# Patient Record
Sex: FEMALE | Race: WHITE | NOT HISPANIC OR LATINO | Employment: OTHER | URBAN - METROPOLITAN AREA
[De-identification: names, ages, dates, MRNs, and addresses within clinical notes are randomized per-mention and may not be internally consistent; named-entity substitution may affect disease eponyms.]

---

## 2023-03-09 DIAGNOSIS — E78.5 HYPERLIPIDEMIA, UNSPECIFIED HYPERLIPIDEMIA TYPE: Primary | ICD-10-CM

## 2023-03-15 RX ORDER — LOVASTATIN 40 MG/1
TABLET ORAL
Qty: 7 TABLET | Refills: 0 | Status: SHIPPED | OUTPATIENT
Start: 2023-03-15 | End: 2023-03-30 | Stop reason: SDUPTHER

## 2023-03-22 DIAGNOSIS — I48.91 UNSPECIFIED ATRIAL FIBRILLATION (MULTI): ICD-10-CM

## 2023-03-29 PROBLEM — M25.562 KNEE PAIN, LEFT: Status: ACTIVE | Noted: 2023-03-29

## 2023-03-29 PROBLEM — M99.05 SEGMENTAL AND SOMATIC DYSFUNCTION OF PELVIC REGION: Status: ACTIVE | Noted: 2023-03-29

## 2023-03-29 PROBLEM — R79.89 ELEVATED SERUM CREATININE: Status: ACTIVE | Noted: 2023-03-29

## 2023-03-29 PROBLEM — M99.03 LUMBOSACRAL DYSFUNCTION: Status: ACTIVE | Noted: 2023-03-29

## 2023-03-29 PROBLEM — R03.0 BLOOD PRESSURE ELEVATED WITHOUT HISTORY OF HTN: Status: ACTIVE | Noted: 2023-03-29

## 2023-03-29 PROBLEM — G47.39 MIXED SLEEP APNEA: Status: ACTIVE | Noted: 2023-03-29

## 2023-03-29 PROBLEM — J30.9 ALLERGIC RHINITIS: Status: ACTIVE | Noted: 2023-03-29

## 2023-03-29 PROBLEM — M99.04 SACROILIAC JOINT SOMATIC DYSFUNCTION: Status: ACTIVE | Noted: 2023-03-29

## 2023-03-29 PROBLEM — M47.816 LUMBAR SPONDYLOSIS: Status: ACTIVE | Noted: 2023-03-29

## 2023-03-29 PROBLEM — E66.01 MORBID OBESITY (MULTI): Status: ACTIVE | Noted: 2023-03-29

## 2023-03-29 PROBLEM — H91.90 HEARING LOSS: Status: ACTIVE | Noted: 2023-03-29

## 2023-03-29 PROBLEM — G89.29 CHRONIC BILATERAL LOW BACK PAIN WITHOUT SCIATICA: Status: ACTIVE | Noted: 2023-03-29

## 2023-03-29 PROBLEM — I48.91 ATRIAL FIBRILLATION (MULTI): Status: ACTIVE | Noted: 2023-03-29

## 2023-03-29 PROBLEM — I50.9 CHF (CONGESTIVE HEART FAILURE) (MULTI): Status: ACTIVE | Noted: 2023-03-29

## 2023-03-29 PROBLEM — E78.5 HYPERLIPIDEMIA: Status: ACTIVE | Noted: 2023-03-29

## 2023-03-29 PROBLEM — D75.1 ERYTHROCYTOSIS: Status: ACTIVE | Noted: 2023-03-29

## 2023-03-29 PROBLEM — M54.9 CHRONIC BACK PAIN: Status: ACTIVE | Noted: 2023-03-29

## 2023-03-29 PROBLEM — M54.16 CHRONIC LUMBAR RADICULOPATHY: Status: ACTIVE | Noted: 2023-03-29

## 2023-03-29 PROBLEM — I50.20 HFREF (HEART FAILURE WITH REDUCED EJECTION FRACTION) (MULTI): Status: ACTIVE | Noted: 2023-03-29

## 2023-03-29 PROBLEM — H93.13 TINNITUS OF BOTH EARS: Status: ACTIVE | Noted: 2023-03-29

## 2023-03-29 PROBLEM — M54.50 CHRONIC BILATERAL LOW BACK PAIN WITHOUT SCIATICA: Status: ACTIVE | Noted: 2023-03-29

## 2023-03-29 PROBLEM — I48.19 PERSISTENT ATRIAL FIBRILLATION (MULTI): Status: ACTIVE | Noted: 2023-03-29

## 2023-03-29 PROBLEM — L98.9 SKIN LESION: Status: ACTIVE | Noted: 2023-03-29

## 2023-03-29 PROBLEM — H90.A22 SENSORINEURAL HEARING LOSS (SNHL) OF LEFT EAR WITH RESTRICTED HEARING OF RIGHT EAR: Status: ACTIVE | Noted: 2023-03-29

## 2023-03-29 PROBLEM — M17.12 ARTHRITIS OF KNEE, LEFT: Status: ACTIVE | Noted: 2023-03-29

## 2023-03-29 PROBLEM — G89.29 CHRONIC BACK PAIN: Status: ACTIVE | Noted: 2023-03-29

## 2023-03-29 PROBLEM — K92.1 HEMATOCHEZIA: Status: ACTIVE | Noted: 2023-03-29

## 2023-03-29 PROBLEM — M99.02 SEGMENTAL AND SOMATIC DYSFUNCTION OF THORACIC REGION: Status: ACTIVE | Noted: 2023-03-29

## 2023-03-29 PROBLEM — G47.33 OSA (OBSTRUCTIVE SLEEP APNEA): Status: ACTIVE | Noted: 2023-03-29

## 2023-03-29 PROBLEM — K59.09 CHRONIC CONSTIPATION: Status: ACTIVE | Noted: 2023-03-29

## 2023-03-29 PROBLEM — H90.A31 MIXED CONDUCTIVE AND SENSORINEURAL HEARING LOSS OF RIGHT EAR WITH RESTRICTED HEARING OF LEFT EAR: Status: ACTIVE | Noted: 2023-03-29

## 2023-03-29 PROBLEM — H90.3 SENSORINEURAL HEARING LOSS (SNHL) OF BOTH EARS: Status: ACTIVE | Noted: 2023-03-29

## 2023-03-29 RX ORDER — METOPROLOL SUCCINATE 50 MG/1
1 TABLET, EXTENDED RELEASE ORAL 2 TIMES DAILY
COMMUNITY
Start: 2022-11-03 | End: 2023-05-26

## 2023-03-29 RX ORDER — MECLIZINE HYDROCHLORIDE 25 MG/1
1 TABLET ORAL EVERY 8 HOURS PRN
COMMUNITY
Start: 2021-11-15 | End: 2024-02-23 | Stop reason: WASHOUT

## 2023-03-29 RX ORDER — LISINOPRIL 5 MG/1
1 TABLET ORAL DAILY
COMMUNITY
End: 2023-11-22 | Stop reason: SDUPTHER

## 2023-03-29 RX ORDER — NORTRIPTYLINE HYDROCHLORIDE 10 MG/1
1 CAPSULE ORAL NIGHTLY
COMMUNITY
Start: 2021-10-25 | End: 2023-06-12

## 2023-03-29 RX ORDER — LEVALBUTEROL TARTRATE 45 UG/1
2 AEROSOL, METERED ORAL EVERY 4 HOURS PRN
COMMUNITY

## 2023-03-29 RX ORDER — CHOLECALCIFEROL (VITAMIN D3) 125 MCG
1 CAPSULE ORAL DAILY
COMMUNITY
Start: 2021-12-16

## 2023-03-29 RX ORDER — FUROSEMIDE 40 MG/1
1 TABLET ORAL DAILY
COMMUNITY
Start: 2022-11-03 | End: 2023-11-08 | Stop reason: ALTCHOICE

## 2023-03-30 ENCOUNTER — OFFICE VISIT (OUTPATIENT)
Dept: PRIMARY CARE | Facility: CLINIC | Age: 73
End: 2023-03-30
Payer: MEDICARE

## 2023-03-30 VITALS
HEART RATE: 82 BPM | OXYGEN SATURATION: 98 % | DIASTOLIC BLOOD PRESSURE: 78 MMHG | BODY MASS INDEX: 54.37 KG/M2 | SYSTOLIC BLOOD PRESSURE: 118 MMHG | HEIGHT: 61 IN | WEIGHT: 288 LBS

## 2023-03-30 DIAGNOSIS — E78.5 HYPERLIPIDEMIA, UNSPECIFIED HYPERLIPIDEMIA TYPE: ICD-10-CM

## 2023-03-30 DIAGNOSIS — I50.20 HFREF (HEART FAILURE WITH REDUCED EJECTION FRACTION) (MULTI): ICD-10-CM

## 2023-03-30 DIAGNOSIS — I48.91 UNSPECIFIED ATRIAL FIBRILLATION (MULTI): ICD-10-CM

## 2023-03-30 DIAGNOSIS — E66.01 MORBID OBESITY (MULTI): ICD-10-CM

## 2023-03-30 PROCEDURE — G0439 PPPS, SUBSEQ VISIT: HCPCS | Performed by: INTERNAL MEDICINE

## 2023-03-30 PROCEDURE — 1170F FXNL STATUS ASSESSED: CPT | Performed by: INTERNAL MEDICINE

## 2023-03-30 PROCEDURE — 1036F TOBACCO NON-USER: CPT | Performed by: INTERNAL MEDICINE

## 2023-03-30 PROCEDURE — 1159F MED LIST DOCD IN RCRD: CPT | Performed by: INTERNAL MEDICINE

## 2023-03-30 PROCEDURE — 99214 OFFICE O/P EST MOD 30 MIN: CPT | Performed by: INTERNAL MEDICINE

## 2023-03-30 RX ORDER — AMIODARONE HYDROCHLORIDE 200 MG/1
TABLET ORAL
COMMUNITY
Start: 2023-03-10 | End: 2023-11-08 | Stop reason: ALTCHOICE

## 2023-03-30 RX ORDER — FLUTICASONE PROPIONATE 50 MCG
SPRAY, SUSPENSION (ML) NASAL
COMMUNITY
End: 2024-05-29 | Stop reason: ALTCHOICE

## 2023-03-30 RX ORDER — CETIRIZINE HYDROCHLORIDE 10 MG/1
TABLET ORAL
COMMUNITY

## 2023-03-30 RX ORDER — DEXTROMETHORPHAN HYDROBROMIDE, GUAIFENESIN 5; 100 MG/5ML; MG/5ML
LIQUID ORAL
COMMUNITY
Start: 2021-11-01

## 2023-03-30 RX ORDER — LOVASTATIN 40 MG/1
40 TABLET ORAL DAILY
Qty: 90 TABLET | Refills: 1 | Status: SHIPPED | OUTPATIENT
Start: 2023-03-30 | End: 2023-09-26

## 2023-03-30 ASSESSMENT — ACTIVITIES OF DAILY LIVING (ADL)
BATHING: INDEPENDENT
MANAGING_FINANCES: INDEPENDENT
GROCERY_SHOPPING: INDEPENDENT
DOING_HOUSEWORK: INDEPENDENT
TAKING_MEDICATION: INDEPENDENT
DRESSING: INDEPENDENT

## 2023-03-30 ASSESSMENT — ENCOUNTER SYMPTOMS
LOSS OF SENSATION IN FEET: 1
OCCASIONAL FEELINGS OF UNSTEADINESS: 1
DEPRESSION: 0

## 2023-03-30 NOTE — PROGRESS NOTES
Chief Complaint: Medicare Wellness Exam/Comprehensive Problem Focused Follow Up     HPI:    Patient overall feels well no active issues here for wellness exam states still in A-fib asymptomatic had cardioversions in the past  Active Problem List      Comprehensive Medical/Surgical/Social/Family History  Past Medical History:   Diagnosis Date    Body mass index (BMI) 50.0-59.9, adult (CMS/HCC) 04/22/2022    BMI 50.0-59.9, adult    Body mass index (BMI) 50.0-59.9, adult (CMS/HCC) 03/17/2022    BMI 50.0-59.9, adult    Essential (primary) hypertension     Benign essential hypertension    Other intervertebral disc displacement, lumbar region     Lumbar herniated disc    Personal history of other diseases of the digestive system     History of diverticulitis    Personal history of other diseases of the digestive system     History of constipation    Personal history of other endocrine, nutritional and metabolic disease     History of mixed hyperlipidemia    Personal history of other endocrine, nutritional and metabolic disease     History of vitamin D deficiency    Personal history of other specified conditions     History of vertigo     Past Surgical History:   Procedure Laterality Date    OTHER SURGICAL HISTORY  12/16/2021    Colonoscopy    OTHER SURGICAL HISTORY  12/16/2021    Knee surgery    OTHER SURGICAL HISTORY  12/16/2021    Partial hysterectomy     Social History     Social History Narrative    Not on file         Allergies and Medications  Enviro stress  Current Outpatient Medications on File Prior to Visit   Medication Sig Dispense Refill    acetaminophen (Tylenol 8 Hour) 650 mg ER tablet 1-2 tablet EVERY 12 HOURS (route: oral)      cetirizine (ZyrTEC) 10 mg tablet Take by mouth.      cholecalciferol (Vitamin D-3) 125 MCG (5000 UT) capsule Take 1 capsule (125 mcg) by mouth once daily.      furosemide (Lasix) 40 mg tablet Take 1 tablet (40 mg) by mouth once daily.      levalbuterol (Xopenex) 45 mcg/actuation  "inhaler Inhale 2 puffs every 4 hours if needed.      lisinopril 5 mg tablet Take 1 tablet (5 mg) by mouth once daily.      meclizine (Antivert) 25 mg tablet Take 1 tablet (25 mg) by mouth every 8 hours if needed.      metoprolol succinate XL (Toprol-XL) 50 mg 24 hr tablet Take 1 tablet (50 mg) by mouth in the morning and 1 tablet (50 mg) before bedtime.      nortriptyline (Pamelor) 10 mg capsule Take 1 capsule (10 mg) by mouth once daily at bedtime.      [DISCONTINUED] apixaban (Eliquis) 5 mg tablet Take 1 tablet (5 mg) by mouth in the morning and 1 tablet (5 mg) in the evening. Do all this for 7 days. PATIENT NEEDS APPOINTMENT. 14 tablet 0    amiodarone (Pacerone) 200 mg tablet       fluticasone (Flonase) 50 mcg/actuation nasal spray Administer into affected nostril(s).      [DISCONTINUED] lovastatin (Mevacor) 40 mg tablet TAKE 1 TABLET BY MOUTH EVERY DAY 7 tablet 0     No current facility-administered medications on file prior to visit.       Medications and Supplements  prescribed by me and other practitioners or clinical pharmacist (such as prescriptions, OTC's, herbal therapies and supplements) were reviewed and documented in the medical record.    Tobacco/Alcohol/Opioid use, as well as Illicit Drug Use was screened for/reviewed and documented in Social History section and medication list as appropriate  Activities of Daily Living  In your present state of health, do you have any difficulty performing the following activities?:   Preparing food and eating?: No  Bathing yourself: No  Getting dressed: No  Using the toilet:No  Moving around from place to place: No  In the past year have you fallen or had a near fall?:No    Depression Screen  (Note: if answer to either of the following is \"Yes\", then a more complete depression screening is indicated)   Q1: Over the past two weeks, have you felt down, depressed or hopeless? No  Q2: Over the past two weeks, have you felt little interest or pleasure in doing things? " No    Current exercise habits:   Dietary issues discussed: Yes  Hearing difficulties: Yes  Safe in current home environment: yes  Visual Acuity assessed: no  Cognitive Impairment assessed: no       Advance directives  Advanced Care Planning (including a Living Will, Healthcare POA, as well as specific end of life choices and/or directives), was discussed for approximately 16 minutes with the patient and/or surrogate, voluntarily, and documented in the medical record.     Cardiac Risk Assessment  Cardiovascular risk was discussed and, if needed, lifestyle modifications recommended, including nutritional choices, exercise, and elimination of habits contributing to risk. We agreed on a plan to reduce the current cardiovascular risk based on above discussion as needed.  Aspirin use/disuse was discussed after reviewing the updated guidelines below:    Consider low dose Aspirin ( mg) use if the benefit for cardiovascular disease prevention outweighs risk for bleeding complications.   In general, low dose ASA should be considered:  In patients WITHOUT prior MI/stroke/PAD (primary prevention):   a. Age <60: Use if 10-year cardiovascular disease risk >20%, with discussion of risks and benefits with patient  b. Age 60-<70: Use if 10-year cardiovascular disease risk >20% and low bleeding (e.g., gastrointenstinal) risk, with discussion of risks and benefits with patient  c. Age >=70: Do not use    In patients WITH prior MI/stroke/PAD (secondary prevention):   Generally use unless extremely high bleeding (e.g., gastrointenstinal) risk, with discussion of risks and benefits with patient    ROS otherwise negative aside from what was mentioned above in HPI.  Health Maintenance  Colonoscopy: 2022  Mammogram: [2022]  Pap smear/Pelvic Exam: []  DEXA: []  Low dose chest CT: []   Screening Abdominal US: []  Opthalmology: []  Podiatry: []    Immunizations  Influenza: []  Pneumovax: []  Prevnar 13: []  Td/Tdap: []  Zostavax:  "[]            ROS:  Constitutional: [] denies fever/night sweats/weight loss/fatigue/insomnia  Head: [] denies trauma/headache/masses  Eyes: [] denies loss of vision/blurry vision/diplopia/redness/eye pain  Ears: [] denies hearing loss/tinnitus/masses/pain/otorrhea  Nose: [] denies anosmia/masses/epistaxis/drainage  Throat: [] denies dysphagia/odynophagia  Neck: [] denies masses/asymmetry  Lymphatics: [] denies lymph node swelling  Cardiovascular: [] denies CP/orthopnea/PND/leg edema/palpitations  Pulmonary: [] denies SOB/dyspnea with exertion/cough/sputum production/hemoptysis/wheezing  GI: [] denies abdominal pain/nausea/vomiting/dysphagia/odynophagia/melena/hematochezia/diarrhea/constipation/change in stool caliber/bowel incontinence  : [] denies dysuria/hematuria/increased frequency/nocturia/dribbling/urinary incontinence  Genital: [] denies discharge/masses/lesions  Musculoskeletal: [] denies trauma/arthralgia/myalgia/deformity/joint swelling  Hematologic: [] denies easy bruising or bleeding  Neurologic: [] denies gait imbalance/paresthesias/saddle paresthesia/focal weakness/dysarthria/seizure  Skin: [] denies masses/lesions/rashes/tattoos  Psychiatric: [] denies depression/suicidal or homicidal thoughts    Vitals  /78   Pulse 82   Ht 1.549 m (5' 1\")   Wt 131 kg (288 lb)   SpO2 98%   BMI 54.42 kg/m²   Body mass index is 54.42 kg/m².  Physical Exam  Gen: Alert, NAD  HEENT:  PERRLA, EOMI, conjunctiva and sclera normal in appearance. External auditory canals/TMs normal; Oral cavity and posterior pharynx without lesions/exudate  Neck:  Supple with FROM; No masses/nodes palpable; Thyroid nontender and without nodules; No DON  Respiratory:  Lungs CTAB  Cardiovascular:  Heart RRR. No M/R/G. Peripheral pulses equal bilaterally  Abdomen:  Soft, nontender, BS present throughout; No R/G/R; No HSM or masses palpated  Extremities:  FROM all extremities; Muscle strength grossly normal with good tone ambulates " with walker  Neuro:  CN II-XII intact; Reflexes 2+/2+; Gross motor and sensory intact  Skin:  No suspicious lesions present  Breast: No masses, skin lesions or nipple discharges, no axillary lymphadenopathy    Assessment and Plan:  Problem List Items Addressed This Visit          Circulatory    HFrEF (heart failure with reduced ejection fraction) (CMS/HCC)       Endocrine/Metabolic    Morbid obesity (CMS/HCC)       Other    Hyperlipidemia    Relevant Medications    lovastatin (Mevacor) 40 mg tablet     Other Visit Diagnoses       Unspecified atrial fibrillation (CMS/HCC)        Relevant Medications    apixaban (Eliquis) 5 mg tablet        Follow-up with hematology as planned made 2023 important  Screening blood work due October 2023 CBC BMP A1c lipid T4 TSH A1c    Thank you for making (Dea    Please follow-up in 6 months    During the course of the visit the patient was educated and counseled about age appropriate screening and preventive services. Completed preventive screenings were documented in the chart and orders were placed for outstanding screenings/procedures as documented in the Assessment and Plan.      Patient Instructions (the written plan) was given to the patient at check out.      Jan Martin, DO

## 2023-04-24 DIAGNOSIS — R03.0 ELEVATED BLOOD-PRESSURE READING, WITHOUT DIAGNOSIS OF HYPERTENSION: ICD-10-CM

## 2023-04-24 RX ORDER — METOPROLOL TARTRATE 25 MG/1
TABLET, FILM COATED ORAL
Qty: 180 TABLET | Refills: 1 | Status: SHIPPED | OUTPATIENT
Start: 2023-04-24 | End: 2023-11-29 | Stop reason: ALTCHOICE

## 2023-05-26 DIAGNOSIS — R03.0 ELEVATED BLOOD-PRESSURE READING, WITHOUT DIAGNOSIS OF HYPERTENSION: ICD-10-CM

## 2023-05-26 RX ORDER — METOPROLOL SUCCINATE 50 MG/1
TABLET, EXTENDED RELEASE ORAL
Qty: 180 TABLET | Refills: 1 | Status: SHIPPED | OUTPATIENT
Start: 2023-05-26 | End: 2023-11-22

## 2023-06-12 DIAGNOSIS — Z00.00 ENCOUNTER FOR GENERAL ADULT MEDICAL EXAMINATION WITHOUT ABNORMAL FINDINGS: ICD-10-CM

## 2023-06-12 RX ORDER — NORTRIPTYLINE HYDROCHLORIDE 10 MG/1
CAPSULE ORAL
Qty: 90 CAPSULE | Refills: 1 | Status: SHIPPED | OUTPATIENT
Start: 2023-06-12 | End: 2023-11-29 | Stop reason: SDUPTHER

## 2023-07-24 ENCOUNTER — HOSPITAL ENCOUNTER (OUTPATIENT)
Dept: DATA CONVERSION | Facility: HOSPITAL | Age: 73
End: 2023-07-24
Attending: ANESTHESIOLOGY | Admitting: ANESTHESIOLOGY
Payer: MEDICARE

## 2023-07-24 DIAGNOSIS — M47.816 SPONDYLOSIS WITHOUT MYELOPATHY OR RADICULOPATHY, LUMBAR REGION: ICD-10-CM

## 2023-07-24 DIAGNOSIS — M54.16 RADICULOPATHY, LUMBAR REGION: ICD-10-CM

## 2023-09-22 DIAGNOSIS — I48.91 UNSPECIFIED ATRIAL FIBRILLATION (MULTI): ICD-10-CM

## 2023-09-25 DIAGNOSIS — E78.5 HYPERLIPIDEMIA, UNSPECIFIED HYPERLIPIDEMIA TYPE: ICD-10-CM

## 2023-09-26 RX ORDER — LOVASTATIN 40 MG/1
40 TABLET ORAL DAILY
Qty: 30 TABLET | Refills: 0 | Status: SHIPPED | OUTPATIENT
Start: 2023-09-26 | End: 2023-10-18

## 2023-09-29 VITALS — BODY MASS INDEX: 49.22 KG/M2 | WEIGHT: 277.78 LBS | HEIGHT: 63 IN

## 2023-10-18 DIAGNOSIS — E78.5 HYPERLIPIDEMIA, UNSPECIFIED HYPERLIPIDEMIA TYPE: ICD-10-CM

## 2023-10-18 RX ORDER — LOVASTATIN 40 MG/1
40 TABLET ORAL DAILY
Qty: 90 TABLET | Refills: 1 | Status: SHIPPED | OUTPATIENT
Start: 2023-10-18 | End: 2023-12-15 | Stop reason: ALTCHOICE

## 2023-10-31 DIAGNOSIS — I48.91 UNSPECIFIED ATRIAL FIBRILLATION (MULTI): ICD-10-CM

## 2023-11-02 RX ORDER — APIXABAN 5 MG/1
5 TABLET, FILM COATED ORAL 2 TIMES DAILY
Qty: 60 TABLET | Refills: 0 | Status: SHIPPED | OUTPATIENT
Start: 2023-11-02 | End: 2023-11-29 | Stop reason: SDUPTHER

## 2023-11-03 PROBLEM — I48.19 PERSISTENT ATRIAL FIBRILLATION (MULTI): Status: RESOLVED | Noted: 2023-03-29 | Resolved: 2023-11-03

## 2023-11-03 PROBLEM — H90.A31 MIXED CONDUCTIVE AND SENSORINEURAL HEARING LOSS OF RIGHT EAR WITH RESTRICTED HEARING OF LEFT EAR: Status: RESOLVED | Noted: 2023-03-29 | Resolved: 2023-11-03

## 2023-11-03 PROBLEM — G47.39 MIXED SLEEP APNEA: Status: RESOLVED | Noted: 2023-03-29 | Resolved: 2023-11-03

## 2023-11-03 PROBLEM — H91.90 HEARING LOSS: Status: RESOLVED | Noted: 2023-03-29 | Resolved: 2023-11-03

## 2023-11-03 PROBLEM — H90.A22 SENSORINEURAL HEARING LOSS (SNHL) OF LEFT EAR WITH RESTRICTED HEARING OF RIGHT EAR: Status: RESOLVED | Noted: 2023-03-29 | Resolved: 2023-11-03

## 2023-11-06 ENCOUNTER — APPOINTMENT (OUTPATIENT)
Dept: CARDIOLOGY | Facility: CLINIC | Age: 73
End: 2023-11-06
Payer: MEDICARE

## 2023-11-08 ENCOUNTER — OFFICE VISIT (OUTPATIENT)
Dept: CARDIOLOGY | Facility: CLINIC | Age: 73
End: 2023-11-08
Payer: MEDICARE

## 2023-11-08 VITALS
WEIGHT: 280 LBS | SYSTOLIC BLOOD PRESSURE: 126 MMHG | BODY MASS INDEX: 49.61 KG/M2 | OXYGEN SATURATION: 97 % | DIASTOLIC BLOOD PRESSURE: 72 MMHG | HEART RATE: 78 BPM | HEIGHT: 63 IN

## 2023-11-08 DIAGNOSIS — I50.20 HFREF (HEART FAILURE WITH REDUCED EJECTION FRACTION) (MULTI): Primary | ICD-10-CM

## 2023-11-08 DIAGNOSIS — I48.0 PAROXYSMAL ATRIAL FIBRILLATION (MULTI): ICD-10-CM

## 2023-11-08 PROCEDURE — 1126F AMNT PAIN NOTED NONE PRSNT: CPT | Performed by: PHYSICIAN ASSISTANT

## 2023-11-08 PROCEDURE — 1036F TOBACCO NON-USER: CPT | Performed by: PHYSICIAN ASSISTANT

## 2023-11-08 PROCEDURE — 1159F MED LIST DOCD IN RCRD: CPT | Performed by: PHYSICIAN ASSISTANT

## 2023-11-08 PROCEDURE — 99214 OFFICE O/P EST MOD 30 MIN: CPT | Performed by: PHYSICIAN ASSISTANT

## 2023-11-08 ASSESSMENT — PAIN SCALES - GENERAL: PAINLEVEL: 0-NO PAIN

## 2023-11-08 NOTE — PROGRESS NOTES
"Chief Complaint:   Atrial fibrillation     History Of Present Illness:    Dea Duenas is a 73 y.o. female presenting with atrial fibrillation, nonischemic cardiomyopathy, and recent nuclear stress test.  LVEF previously reduced at 40-45% on 2D echo around the time she was diagnosed with atrial fibrillation.  Patient has been maintaining sinus rhythm on all accounts over at least the past 6 months.  Pharmacologic nuclear stress test displayed normal myocardial perfusion in response to chemical stress, LVEF 61%.  Patient continues with Eliquis 5mg BID.  Patient denies chest pain, chest pressure, palpitations, dyspnea on exertion, shortness of breath at rest, diaphoresis, nausea/vomiting, back pain, headache, lightheadedness, dizziness, syncope or presyncopal episodes, active bleeding signs or symptoms, excessive weight gain, muscle or joint pain, claudication.     Last Recorded Vitals:  Vitals:    11/08/23 1403   BP: 126/72   BP Location: Left arm   Patient Position: Sitting   BP Cuff Size: Adult   Pulse: 78   SpO2: 97%   Weight: 127 kg (280 lb)   Height: 1.6 m (5' 3\")       Past Medical History:  She has a past medical history of Body mass index (BMI) 50.0-59.9, adult (CMS/[facility]) (04/22/2022), Body mass index (BMI) 50.0-59.9, adult (CMS/[facility]) (03/17/2022), Essential (primary) hypertension, Other intervertebral disc displacement, lumbar region, Personal history of other diseases of the digestive system, Personal history of other diseases of the digestive system, Personal history of other endocrine, nutritional and metabolic disease, Personal history of other endocrine, nutritional and metabolic disease, and Personal history of other specified conditions.    Past Surgical History:  She has a past surgical history that includes Other surgical history (12/16/2021); Other surgical history (12/16/2021); and Other surgical history (12/16/2021).      Social History:  She reports that she has never smoked. She has never " used smokeless tobacco. She reports that she does not currently use alcohol. She reports that she does not use drugs.    Family History:  Family History   Problem Relation Name Age of Onset    Colon cancer Mother      Rectal cancer Mother      Cirrhosis Father      Lung cancer Father      Heart attack Father      Colon cancer Mother's Sister          Allergies:  Enviro stress    Outpatient Medications:  Current Outpatient Medications   Medication Instructions    acetaminophen (Tylenol 8 Hour) 650 mg ER tablet 1-2 tablet EVERY 12 HOURS (route: oral)    cetirizine (ZyrTEC) 10 mg tablet oral    cholecalciferol (Vitamin D-3) 125 MCG (5000 UT) capsule 1 capsule, oral, Daily    Eliquis 5 mg, oral, 2 times daily    fluticasone (Flonase) 50 mcg/actuation nasal spray nasal    levalbuterol (Xopenex) 45 mcg/actuation inhaler 2 puffs, inhalation, Every 4 hours PRN    lisinopril 5 mg tablet 1 tablet, oral, Daily    lovastatin (MEVACOR) 40 mg, oral, Daily    meclizine (Antivert) 25 mg tablet 1 tablet, oral, Every 8 hours PRN    metoprolol succinate XL (Toprol-XL) 50 mg 24 hr tablet TAKE 1 TABLET BY MOUTH TWICE A DAY    metoprolol tartrate (Lopressor) 25 mg tablet TAKE 1 TABLET TWICE A DAY    nortriptyline (Pamelor) 10 mg capsule TAKE 1 CAPSULE BY MOUTH EVERYDAY AT BEDTIME       Physical Exam:  Constitutional: awake and alert, oriented ×3, no apparent distress  Skin: warm, dry, good turgor no obvious lesions  Eyes: pupils equal, round, reactive to light, conjunctiva pink and noninjected, no discharge  HENT: normocephalic and atraumatic, mucous membranes moist, trachea midline with no masses/goiter  Cardiovascular: S1/S2 regular, no murmur no rubs/gallops, no carotid bruits, no JVD  Pulmonary: symmetrical chest expansion, lungs are clear to auscultation bilaterally, no wheezes/rales/rhonchi, normal effort  Abdomen: nontender, nondistended, active bowel sounds, no ascites  Extremities: no cyanosis, clubbing, no LE edema no lesions;  "palpable pedal pulses  Neurologic: cranial nerves II - XII grossly intact, stable gait, no tremor       Last Labs:  CBC -  Lab Results   Component Value Date    WBC 6.8 05/18/2023    HGB 15.0 05/18/2023    HCT 47.0 (H) 05/18/2023    MCV 89 05/18/2023     05/18/2023       CMP -  Lab Results   Component Value Date    CALCIUM 9.1 05/18/2023    PHOS 4.6 11/03/2022    PROT 6.4 05/18/2023    ALBUMIN 3.8 05/18/2023    AST 14 05/18/2023    ALT 15 05/18/2023    ALKPHOS 59 05/18/2023    BILITOT 0.9 05/18/2023       LIPID PANEL -   Lab Results   Component Value Date    CHOL 196 10/30/2022    TRIG 159 (H) 10/30/2022    HDL 53.5 10/30/2022    CHHDL 3.7 10/30/2022    LDLF 111 (H) 10/30/2022    VLDL 32 10/30/2022    NHDL 203 12/16/2021       RENAL FUNCTION PANEL -   Lab Results   Component Value Date    GLUCOSE 93 05/18/2023     05/18/2023    K 4.3 05/18/2023     05/18/2023    CO2 31 05/18/2023    ANIONGAP 11 05/18/2023    BUN 17 05/18/2023    CREATININE 1.16 (H) 05/18/2023    CALCIUM 9.1 05/18/2023    PHOS 4.6 11/03/2022    ALBUMIN 3.8 05/18/2023        Lab Results   Component Value Date     (H) 11/30/2022    HGBA1C 5.6 10/30/2022       Last Cardiology Tests:  ECG:  No results found for this or any previous visit from the past 1095 days.      Echo:  No results found for this or any previous visit from the past 1095 days.      Ejection Fractions:  No results found for: \"EF\"    Cath:  No results found for this or any previous visit from the past 1095 days.      Stress Test:  Nuclear Stress Test 8/22/2023  1. Normal stress myocardial perfusion imaging in response to  pharmacologic stress.  2. Well-maintained left ventricular function.  61%    Cardiac Imaging:  No results found for this or any previous visit from the past 1095 days.        Assessment/Plan   Problem List Items Addressed This Visit             ICD-10-CM       Cardiac and Vasculature    Atrial fibrillation (CMS/HCC) I48.91    HFrEF (heart " failure with reduced ejection fraction) (CMS/HCC) - Primary I50.20     -Normalization of LV systolic function on recent pharmacologic nuclear stress test    -Continue DOAC therapy for CVA prophylaxis, no obvious signs/symptoms of pathologic bleeding    -F/U with Dr. Maria in 2/2024 as previously scheduled    LENNY CastilloC

## 2023-11-22 DIAGNOSIS — R03.0 ELEVATED BLOOD-PRESSURE READING, WITHOUT DIAGNOSIS OF HYPERTENSION: ICD-10-CM

## 2023-11-22 RX ORDER — LISINOPRIL 5 MG/1
5 TABLET ORAL DAILY
Qty: 90 TABLET | Refills: 3 | Status: SHIPPED | OUTPATIENT
Start: 2023-11-22 | End: 2023-11-29 | Stop reason: ALTCHOICE

## 2023-11-22 RX ORDER — METOPROLOL SUCCINATE 50 MG/1
50 TABLET, EXTENDED RELEASE ORAL 2 TIMES DAILY
Qty: 60 TABLET | Refills: 0 | Status: SHIPPED | OUTPATIENT
Start: 2023-11-22 | End: 2023-12-20

## 2023-11-29 ENCOUNTER — OFFICE VISIT (OUTPATIENT)
Dept: PRIMARY CARE | Facility: CLINIC | Age: 73
End: 2023-11-29
Payer: MEDICARE

## 2023-11-29 VITALS — SYSTOLIC BLOOD PRESSURE: 126 MMHG | DIASTOLIC BLOOD PRESSURE: 72 MMHG | BODY MASS INDEX: 50.49 KG/M2 | WEIGHT: 285 LBS

## 2023-11-29 DIAGNOSIS — Z00.00 ENCOUNTER FOR GENERAL ADULT MEDICAL EXAMINATION WITHOUT ABNORMAL FINDINGS: ICD-10-CM

## 2023-11-29 DIAGNOSIS — Z12.31 VISIT FOR SCREENING MAMMOGRAM: ICD-10-CM

## 2023-11-29 DIAGNOSIS — R20.2 PARESTHESIAS IN LEFT HAND: ICD-10-CM

## 2023-11-29 DIAGNOSIS — I48.91 UNSPECIFIED ATRIAL FIBRILLATION (MULTI): ICD-10-CM

## 2023-11-29 DIAGNOSIS — I10 PRIMARY HYPERTENSION: Primary | ICD-10-CM

## 2023-11-29 DIAGNOSIS — Z00.00 HEALTHCARE MAINTENANCE: ICD-10-CM

## 2023-11-29 PROCEDURE — 1126F AMNT PAIN NOTED NONE PRSNT: CPT | Performed by: INTERNAL MEDICINE

## 2023-11-29 PROCEDURE — 3078F DIAST BP <80 MM HG: CPT | Performed by: INTERNAL MEDICINE

## 2023-11-29 PROCEDURE — 3074F SYST BP LT 130 MM HG: CPT | Performed by: INTERNAL MEDICINE

## 2023-11-29 PROCEDURE — 1159F MED LIST DOCD IN RCRD: CPT | Performed by: INTERNAL MEDICINE

## 2023-11-29 PROCEDURE — 1036F TOBACCO NON-USER: CPT | Performed by: INTERNAL MEDICINE

## 2023-11-29 PROCEDURE — 99214 OFFICE O/P EST MOD 30 MIN: CPT | Performed by: INTERNAL MEDICINE

## 2023-11-29 RX ORDER — FUROSEMIDE 40 MG/1
TABLET ORAL
COMMUNITY
Start: 2023-11-22 | End: 2024-02-28

## 2023-11-29 RX ORDER — LISINOPRIL 10 MG/1
10 TABLET ORAL DAILY
COMMUNITY
End: 2023-11-29 | Stop reason: SDUPTHER

## 2023-11-29 RX ORDER — NORTRIPTYLINE HYDROCHLORIDE 10 MG/1
10 CAPSULE ORAL NIGHTLY
Qty: 90 CAPSULE | Refills: 1 | Status: SHIPPED | OUTPATIENT
Start: 2023-11-29 | End: 2024-05-29 | Stop reason: SDUPTHER

## 2023-11-29 RX ORDER — LISINOPRIL 10 MG/1
10 TABLET ORAL DAILY
Qty: 90 TABLET | Refills: 1 | Status: SHIPPED | OUTPATIENT
Start: 2023-11-29 | End: 2024-05-29 | Stop reason: SDUPTHER

## 2023-11-29 NOTE — PROGRESS NOTES
Subjective   Patient ID: Dea Duenas is a 73 y.o. female who presents for Follow-up and Med Refill.  HPI        year old female with past medical history of hypertension, hyperlipidemia, diverticulitis, chronic lumbar radiculopathy, carpal tunnel syndrome of the right hand, and osteoarthritis.  Urgent care visit 11/2021 for carpal tunnel of the right hand. Provided with wrist brace at the time.   ER presentation November 3, 2022 respiratory failure secondary to CHF exacerbation 4 to 45% systolic new onset A. fib CT PE negative    Patient states she is overall feeling very well occasionally gets tingling in her left wrist that goes into her first 3 digits over the last 6 months grade 3 out of 10 worse when she lays down at night better when she wiggles it and loosens it up during the day  Denies any paralysis joint swelling redness trauma        Health Maintenance:      Colonoscopy: 2022 due 2027      Mammogram: 2022      Pelvic/Pap:      Low dose chest CT:      Aorta duplex:      Optho:      Podiatry:        Vaccines:      Prevnar 20:      Prevnar 13:      Pneumovax 23:      Tdap:      Shingrix:      COVID:      Influenza:        ROS:      General: denies fever/chills/weight loss      Head: denies HA/trauma/masses/dizziness      Eyes: denies vision change/loss of vision/blurry vision/diplopia/eye pain      Ears: denies hearing loss/tinnitus/otalgia/otorrhea      Nose: denies nasal drainage/anosmia      Throat: denies dysphagia/odynophagia      Lymphatics: denies lymph node swelling      Cardiac: denies CP/palpitations/orthopnea/PND      Pulmonary: denies dyspnea/cough/wheezing      GI: denies abd pain/n/v/diarrhea/melena/hematochezia/hematemesis      : denies dysuria/hematuria/change frequency      Genital: denies genital discharge/lesions      Skin: denies rashes/lesions/masses      MSK: denies weakness/swelling/edema/gait imbalance/pain      Neuro: Occasional paresthesias left wrist hand 6 months denies  "paresthesias/seizures/dysarthria      Psych: denies depression/anxiety/suicidal or homicidal ideations            Objective   /72   Wt 129 kg (285 lb)   BMI 50.49 kg/m²      Physical Exam:     General: AO3, NAD     Head: atraumatic/NC     Eyes: EOMI/PERRLA. Negative APD     Ears: TM pearly gray, EAC clear. No lesions or erythema     Nose: symmetric nares, no discharge     Throat: trachea midline, uvula midline pink mucosa. No thyromegaly     Lymphatics: no cervical/supraclavicular/ant or posterior cervical adenopathy/axillary/inguinal adenopathy     Breast: not examined     Chest: no deformity or tenderness to palpation     Pulm: CTA b/l, no wheeze/rhonchi/rales. nonlabored     Cardiac: RRR +s1s2, no m/r/g.      GI: soft, NT/ND. Normoactive Bsx4. No rebound/guarding.     Rectal: no examined     MSK: Negative Tinel's bilateral 5/5 strength UE LE. No edema/clubbing/cyanosis     Skin: no rashes/lesions     Vascular: 2+ palp DP PT radials b/l. Negative carotid bruit     Neuro: CNII-XII intact. No focal deficits. Reflexes 2/4 brachioradialis bicep tricep patellar achilles. Finger to nose intact.     Psych: appropriate mood/affect                    No results found for: \"BMPR1A\", \"CBCDIF\"  Patient refused EMG at this time    Assessment/Plan   Diagnoses and all orders for this visit:  Primary hypertension  -     lisinopril 10 mg tablet; Take 1 tablet (10 mg) by mouth once daily.  Unspecified atrial fibrillation (CMS/HCC)  -     apixaban (Eliquis) 5 mg tablet; Take 1 tab bid  Encounter for general adult medical examination without abnormal findings  -     nortriptyline (Pamelor) 10 mg capsule; Take 1 capsule (10 mg) by mouth once daily at bedtime.  Healthcare maintenance  -     Lipid Panel; Future  -     Hemoglobin A1C; Future  -     Thyroxine, Free; Future  -     Thyroid Stimulating Hormone; Future  Visit for screening mammogram  -     BI mammo bilateral screening tomosynthesis; Future  Paresthesias in left " hand  Comments:  Suspect carpal tunnel    Recommend wearing a wrist splint every night monitor if any worsening as we discussed we will do the EMG and follow-up with orthopedic for evaluation    Cardiology recommendations appreciated follow-up in February    Please call and follow-up with pulmonary as ordered    Please call and follow-up with hematology as ordered    Screening blood work due May 2024    Thank you for making appointment today Dea    Please follow-up 6 months       Chucky Harper and med refill

## 2023-12-07 ENCOUNTER — LAB (OUTPATIENT)
Dept: LAB | Facility: LAB | Age: 73
End: 2023-12-07
Payer: MEDICARE

## 2023-12-07 ENCOUNTER — ANCILLARY PROCEDURE (OUTPATIENT)
Dept: RADIOLOGY | Facility: CLINIC | Age: 73
End: 2023-12-07
Payer: MEDICARE

## 2023-12-07 DIAGNOSIS — Z12.31 VISIT FOR SCREENING MAMMOGRAM: ICD-10-CM

## 2023-12-07 DIAGNOSIS — Z00.00 HEALTHCARE MAINTENANCE: ICD-10-CM

## 2023-12-07 LAB
CHOLEST SERPL-MCNC: 253 MG/DL (ref 0–199)
CHOLESTEROL/HDL RATIO: 4.8
HDLC SERPL-MCNC: 52.5 MG/DL
LDLC SERPL CALC-MCNC: ABNORMAL MG/DL
NON HDL CHOLESTEROL: 201 MG/DL (ref 0–149)
T4 FREE SERPL-MCNC: 0.98 NG/DL (ref 0.61–1.12)
TRIGL SERPL-MCNC: 417 MG/DL (ref 0–149)
TSH SERPL-ACNC: 1.97 MIU/L (ref 0.44–3.98)
VLDL: ABNORMAL

## 2023-12-07 PROCEDURE — 80061 LIPID PANEL: CPT

## 2023-12-07 PROCEDURE — 83036 HEMOGLOBIN GLYCOSYLATED A1C: CPT

## 2023-12-07 PROCEDURE — 36415 COLL VENOUS BLD VENIPUNCTURE: CPT

## 2023-12-07 PROCEDURE — 77063 BREAST TOMOSYNTHESIS BI: CPT

## 2023-12-07 PROCEDURE — 84439 ASSAY OF FREE THYROXINE: CPT

## 2023-12-07 PROCEDURE — 77067 SCR MAMMO BI INCL CAD: CPT | Performed by: RADIOLOGY

## 2023-12-07 PROCEDURE — 84443 ASSAY THYROID STIM HORMONE: CPT

## 2023-12-07 PROCEDURE — 77063 BREAST TOMOSYNTHESIS BI: CPT | Performed by: RADIOLOGY

## 2023-12-08 LAB
EST. AVERAGE GLUCOSE BLD GHB EST-MCNC: 108 MG/DL
HBA1C MFR BLD: 5.4 %

## 2023-12-15 DIAGNOSIS — E78.5 HYPERLIPIDEMIA, UNSPECIFIED HYPERLIPIDEMIA TYPE: Primary | ICD-10-CM

## 2023-12-15 RX ORDER — ATORVASTATIN CALCIUM 10 MG/1
10 TABLET, FILM COATED ORAL DAILY
Qty: 30 TABLET | Refills: 5 | Status: SHIPPED | OUTPATIENT
Start: 2023-12-15 | End: 2024-04-11

## 2023-12-19 DIAGNOSIS — R03.0 ELEVATED BLOOD-PRESSURE READING, WITHOUT DIAGNOSIS OF HYPERTENSION: ICD-10-CM

## 2023-12-20 RX ORDER — METOPROLOL SUCCINATE 50 MG/1
50 TABLET, EXTENDED RELEASE ORAL 2 TIMES DAILY
Qty: 180 TABLET | Refills: 1 | Status: SHIPPED | OUTPATIENT
Start: 2023-12-20 | End: 2024-04-11

## 2023-12-26 ENCOUNTER — TELEPHONE (OUTPATIENT)
Dept: PRIMARY CARE | Facility: CLINIC | Age: 73
End: 2023-12-26
Payer: MEDICARE

## 2024-02-21 PROBLEM — I48.91 ATRIAL FIBRILLATION (MULTI): Chronic | Status: ACTIVE | Noted: 2023-03-29

## 2024-02-21 PROBLEM — I50.20 HFREF (HEART FAILURE WITH REDUCED EJECTION FRACTION) (MULTI): Chronic | Status: ACTIVE | Noted: 2023-03-29

## 2024-02-22 PROBLEM — H90.3 SENSORINEURAL HEARING LOSS (SNHL) OF BOTH EARS: Status: RESOLVED | Noted: 2023-03-29 | Resolved: 2024-02-22

## 2024-02-22 PROBLEM — E78.5 HYPERLIPIDEMIA: Chronic | Status: ACTIVE | Noted: 2023-03-29

## 2024-02-22 PROBLEM — H93.13 TINNITUS OF BOTH EARS: Status: RESOLVED | Noted: 2023-03-29 | Resolved: 2024-02-22

## 2024-02-22 PROBLEM — R79.89 ELEVATED SERUM CREATININE: Status: RESOLVED | Noted: 2023-03-29 | Resolved: 2024-02-22

## 2024-02-22 PROBLEM — G47.33 OSA (OBSTRUCTIVE SLEEP APNEA): Chronic | Status: ACTIVE | Noted: 2023-03-29

## 2024-02-22 PROBLEM — K59.09 CHRONIC CONSTIPATION: Status: RESOLVED | Noted: 2023-03-29 | Resolved: 2024-02-22

## 2024-02-22 PROBLEM — D75.1 ERYTHROCYTOSIS: Status: RESOLVED | Noted: 2023-03-29 | Resolved: 2024-02-22

## 2024-02-22 PROBLEM — J30.9 ALLERGIC RHINITIS: Status: RESOLVED | Noted: 2023-03-29 | Resolved: 2024-02-22

## 2024-02-23 ENCOUNTER — OFFICE VISIT (OUTPATIENT)
Dept: CARDIOLOGY | Facility: CLINIC | Age: 74
End: 2024-02-23
Payer: MEDICARE

## 2024-02-23 VITALS
OXYGEN SATURATION: 94 % | DIASTOLIC BLOOD PRESSURE: 82 MMHG | BODY MASS INDEX: 48.54 KG/M2 | WEIGHT: 274 LBS | HEART RATE: 89 BPM | SYSTOLIC BLOOD PRESSURE: 134 MMHG

## 2024-02-23 DIAGNOSIS — I50.20 HFREF (HEART FAILURE WITH REDUCED EJECTION FRACTION) (MULTI): Chronic | ICD-10-CM

## 2024-02-23 DIAGNOSIS — E78.2 MIXED HYPERLIPIDEMIA: Primary | Chronic | ICD-10-CM

## 2024-02-23 DIAGNOSIS — I48.0 PAROXYSMAL ATRIAL FIBRILLATION (MULTI): Chronic | ICD-10-CM

## 2024-02-23 PROCEDURE — 1160F RVW MEDS BY RX/DR IN RCRD: CPT | Performed by: INTERNAL MEDICINE

## 2024-02-23 PROCEDURE — 99214 OFFICE O/P EST MOD 30 MIN: CPT | Performed by: INTERNAL MEDICINE

## 2024-02-23 PROCEDURE — 1159F MED LIST DOCD IN RCRD: CPT | Performed by: INTERNAL MEDICINE

## 2024-02-23 PROCEDURE — 1126F AMNT PAIN NOTED NONE PRSNT: CPT | Performed by: INTERNAL MEDICINE

## 2024-02-23 PROCEDURE — 1036F TOBACCO NON-USER: CPT | Performed by: INTERNAL MEDICINE

## 2024-02-23 NOTE — PATIENT INSTRUCTIONS
1. Paroxysmal atrial fibrillation. Chads vascular score 4. Continue Eliquis.  She remains regular on examination.  She is off of amiodarone.  I do believe a lot of the A-fib in 2022 was triggered by sleep apnea.  She is not using the CPAP machine and sleeping well.  Continue Eliquis    2. Respiratory failure. She was on oxygen. Recently she has not been using her oxygen and she feels terrific. She does have severe sleep apnea and fortunately is using her CPAP.     3. Cardiomyopathy. Likely nonischemic.  At the time of her A-fib her ejection fraction on echo was 40 to 45%.  In August 2023 we will repeat a regadenoson nuclear stress test to look for any evidence of ischemia.  In fact was normal.  Her ejection fraction normalized to 61%.  This is related to A-fib.      4. Hyperlipidemia. She is on lovastatin. This is followed by Dr. Martin. 12/7/23 Trig 417, HDL 53, BS 93.  She admits to dietary discretion and not exercising as much.  Her lovastatin was stopped and she was placed on atorvastatin.  This is followed by her primary care doctor.  I stressed the importance of diet and exercise.  Her TSH and her hemoglobin A1c were normal at this time     Continue with her current therapy.  No indication for antiarrhythmic therapy.  She was already turned down for ablation by Dr. Ramicone.  I will see her back in approximately 6 to 8 months.

## 2024-02-23 NOTE — PROGRESS NOTES
Referred by No ref. provider found    HPI I am seeing Dea for a 6-month follow-up.  She is feeling fine.  No chest pain no pressure no heaviness no shortness of breath and no palpitations. She's feeling much better then when I had met her in 2022    Past Medical History:  Problem List Items Addressed This Visit    None       Past Medical History:   Diagnosis Date    Atrial fibrillation (CMS/[facility]) 03/29/2023    New 11/2022. CHADS Vasc 4. On Eliiquis  CVN 12/9/22 transiently successfule. Loaded wtih amio repeat CVN but unable to maintain sinus. Seen by Dr. HERNANDEZ and ablation not felt to be the correct option    Body mass index (BMI) 50.0-59.9, adult (CMS/HCC) 04/22/2022    BMI 50.0-59.9, adult    Body mass index (BMI) 50.0-59.9, adult (CMS/HCC) 03/17/2022    BMI 50.0-59.9, adult    Essential (primary) hypertension     Benign essential hypertension    HFrEF (heart failure with reduced ejection fraction) (CMS/[facility]) 03/29/2023    EF 40-45% on echo of 10/31/2022    Other intervertebral disc displacement, lumbar region     Lumbar herniated disc    Personal history of other diseases of the digestive system     History of diverticulitis    Personal history of other diseases of the digestive system     History of constipation    Personal history of other endocrine, nutritional and metabolic disease     History of mixed hyperlipidemia    Personal history of other endocrine, nutritional and metabolic disease     History of vitamin D deficiency    Personal history of other specified conditions     History of vertigo             Past Surgical History:  She has a past surgical history that includes Other surgical history (12/16/2021); Other surgical history (12/16/2021); and Other surgical history (12/16/2021).      Social History:  She reports that she has never smoked. She has never used smokeless tobacco. She reports that she does not currently use alcohol. She reports that she does not use drugs.    Family History:  Family  History   Problem Relation Name Age of Onset    Colon cancer Mother      Rectal cancer Mother      Cirrhosis Father      Lung cancer Father      Heart attack Father      Colon cancer Mother's Sister          Allergies:  Enviro stress    Outpatient Medications:  Current Outpatient Medications   Medication Instructions    acetaminophen (Tylenol 8 Hour) 650 mg ER tablet 1-2 tablet EVERY 12 HOURS (route: oral)    apixaban (Eliquis) 5 mg tablet Take 1 tab bid    atorvastatin (LIPITOR) 10 mg, oral, Daily    cetirizine (ZyrTEC) 10 mg tablet oral    cholecalciferol (Vitamin D-3) 125 MCG (5000 UT) capsule 1 capsule, oral, Daily    fluticasone (Flonase) 50 mcg/actuation nasal spray nasal    furosemide (Lasix) 40 mg tablet     levalbuterol (Xopenex) 45 mcg/actuation inhaler 2 puffs, inhalation, Every 4 hours PRN    lisinopril 10 mg, oral, Daily    meclizine (Antivert) 25 mg tablet 1 tablet, oral, Every 8 hours PRN    metoprolol succinate XL (TOPROL-XL) 50 mg, oral, 2 times daily    nortriptyline (PAMELOR) 10 mg, oral, Nightly        Last Recorded Vitals:  There were no vitals filed for this visit.    Physical Exam    Physical  Patient is alert and oriented x3.  HEENT is unremarkable mucous members are moist  Neck no JVP no bruits upstrokes are full no thyromegaly  Lungs are clear bilaterally.  No wheezing crackles or rales  Heart regular rhythm normal S1-S2 there is no S3 no murmurs are heard.  Abdomen is soft vessels are positive nontender nondistended no organomegaly no pulsatile masses  Extremities have no edema.  Distal pulses present palpable.  Neuro is grossly nonfocal  Skin has no rashes     Last Labs:  CBC -  Lab Results   Component Value Date    WBC 6.8 05/18/2023    HGB 15.0 05/18/2023    HCT 47.0 (H) 05/18/2023    MCV 89 05/18/2023     05/18/2023       CMP -  Lab Results   Component Value Date    CALCIUM 9.1 05/18/2023    PHOS 4.6 11/03/2022    PROT 6.4 05/18/2023    ALBUMIN 3.8 05/18/2023    AST 14  05/18/2023    ALT 15 05/18/2023    ALKPHOS 59 05/18/2023    BILITOT 0.9 05/18/2023       LIPID PANEL -   Lab Results   Component Value Date    CHOL 253 (H) 12/07/2023    HDL 52.5 12/07/2023    CHHDL 4.8 12/07/2023    VLDL  12/07/2023      Comment:      Unable to calculate VLDL.    TRIG 417 (H) 12/07/2023    NHDL 201 (H) 12/07/2023       RENAL FUNCTION PANEL -   Lab Results   Component Value Date    K 4.3 05/18/2023    PHOS 4.6 11/03/2022       Lab Results   Component Value Date     (H) 11/30/2022    HGBA1C 5.4 12/07/2023     Procedure    AST 8/21/23 NL, EF 61%    ECHO [10/31/2022]: EF 40-45%. Global hypok.          Assessment/Plan     1. Paroxysmal atrial fibrillation. Chads vascular score 4. Continue Eliquis.  She remains regular on examination.  She is off of amiodarone.  I do believe a lot of the A-fib in 2022 was triggered by sleep apnea.  She is not using the CPAP machine and sleeping well.  Continue Eliquis    2. Respiratory failure. She was on oxygen. Recently she has not been using her oxygen and she feels terrific. She does have severe sleep apnea and fortunately is using her CPAP.     3. Cardiomyopathy. Likely nonischemic.  At the time of her A-fib her ejection fraction on echo was 40 to 45%.  In August 2023 we will repeat a regadenoson nuclear stress test to look for any evidence of ischemia.  In fact was normal.  Her ejection fraction normalized to 61%.  This is related to A-fib.      4. Hyperlipidemia. She is on lovastatin. This is followed by Dr. Martin. 12/7/23 Trig 417, HDL 53, BS 93.  She admits to dietary discretion and not exercising as much.  Her lovastatin was stopped and she was placed on atorvastatin.  This is followed by her primary care doctor.  I stressed the importance of diet and exercise.  Her TSH and her hemoglobin A1c were normal at this time     Continue with her current therapy.  No indication for antiarrhythmic therapy.  She was already turned down for ablation by   [facility].  I will see her back in approximately 6 to 8 months.      Guillermo Maria MD     Instructions and follow up

## 2024-02-26 DIAGNOSIS — I50.9 HEART FAILURE, UNSPECIFIED (MULTI): ICD-10-CM

## 2024-02-28 RX ORDER — FUROSEMIDE 40 MG/1
40 TABLET ORAL DAILY
Qty: 90 TABLET | Refills: 3 | Status: SHIPPED | OUTPATIENT
Start: 2024-02-28

## 2024-04-11 DIAGNOSIS — R03.0 ELEVATED BLOOD-PRESSURE READING, WITHOUT DIAGNOSIS OF HYPERTENSION: ICD-10-CM

## 2024-04-11 DIAGNOSIS — E78.5 HYPERLIPIDEMIA, UNSPECIFIED HYPERLIPIDEMIA TYPE: ICD-10-CM

## 2024-04-11 RX ORDER — ATORVASTATIN CALCIUM 10 MG/1
10 TABLET, FILM COATED ORAL DAILY
Qty: 90 TABLET | Refills: 0 | Status: SHIPPED | OUTPATIENT
Start: 2024-04-11 | End: 2024-05-29 | Stop reason: SDUPTHER

## 2024-04-11 RX ORDER — METOPROLOL SUCCINATE 50 MG/1
50 TABLET, EXTENDED RELEASE ORAL 2 TIMES DAILY
Qty: 180 TABLET | Refills: 0 | Status: SHIPPED | OUTPATIENT
Start: 2024-04-11 | End: 2024-05-29 | Stop reason: SDUPTHER

## 2024-05-29 ENCOUNTER — OFFICE VISIT (OUTPATIENT)
Dept: PRIMARY CARE | Facility: CLINIC | Age: 74
End: 2024-05-29
Payer: MEDICARE

## 2024-05-29 VITALS
HEIGHT: 63 IN | WEIGHT: 278 LBS | BODY MASS INDEX: 49.26 KG/M2 | SYSTOLIC BLOOD PRESSURE: 104 MMHG | HEART RATE: 81 BPM | OXYGEN SATURATION: 98 % | DIASTOLIC BLOOD PRESSURE: 68 MMHG

## 2024-05-29 DIAGNOSIS — R03.0 ELEVATED BLOOD-PRESSURE READING, WITHOUT DIAGNOSIS OF HYPERTENSION: ICD-10-CM

## 2024-05-29 DIAGNOSIS — R20.2 PARESTHESIAS: ICD-10-CM

## 2024-05-29 DIAGNOSIS — Z00.00 ENCOUNTER FOR GENERAL ADULT MEDICAL EXAMINATION WITHOUT ABNORMAL FINDINGS: ICD-10-CM

## 2024-05-29 DIAGNOSIS — E78.5 HYPERLIPIDEMIA, UNSPECIFIED HYPERLIPIDEMIA TYPE: ICD-10-CM

## 2024-05-29 DIAGNOSIS — I10 PRIMARY HYPERTENSION: ICD-10-CM

## 2024-05-29 DIAGNOSIS — Z00.00 HEALTHCARE MAINTENANCE: Primary | ICD-10-CM

## 2024-05-29 DIAGNOSIS — E66.01 MORBID OBESITY (MULTI): ICD-10-CM

## 2024-05-29 DIAGNOSIS — Z91.89 AT RISK FOR IMPAIRED HEALTH MAINTENANCE: ICD-10-CM

## 2024-05-29 DIAGNOSIS — I48.91 UNSPECIFIED ATRIAL FIBRILLATION (MULTI): ICD-10-CM

## 2024-05-29 DIAGNOSIS — E55.9 VITAMIN D DEFICIENCY: ICD-10-CM

## 2024-05-29 PROCEDURE — 3078F DIAST BP <80 MM HG: CPT | Performed by: INTERNAL MEDICINE

## 2024-05-29 PROCEDURE — 1036F TOBACCO NON-USER: CPT | Performed by: INTERNAL MEDICINE

## 2024-05-29 PROCEDURE — 1158F ADVNC CARE PLAN TLK DOCD: CPT | Performed by: INTERNAL MEDICINE

## 2024-05-29 PROCEDURE — 1159F MED LIST DOCD IN RCRD: CPT | Performed by: INTERNAL MEDICINE

## 2024-05-29 PROCEDURE — 1170F FXNL STATUS ASSESSED: CPT | Performed by: INTERNAL MEDICINE

## 2024-05-29 PROCEDURE — 99213 OFFICE O/P EST LOW 20 MIN: CPT | Performed by: INTERNAL MEDICINE

## 2024-05-29 PROCEDURE — 1160F RVW MEDS BY RX/DR IN RCRD: CPT | Performed by: INTERNAL MEDICINE

## 2024-05-29 PROCEDURE — G0439 PPPS, SUBSEQ VISIT: HCPCS | Performed by: INTERNAL MEDICINE

## 2024-05-29 PROCEDURE — 3074F SYST BP LT 130 MM HG: CPT | Performed by: INTERNAL MEDICINE

## 2024-05-29 PROCEDURE — 1123F ACP DISCUSS/DSCN MKR DOCD: CPT | Performed by: INTERNAL MEDICINE

## 2024-05-29 RX ORDER — LISINOPRIL 10 MG/1
10 TABLET ORAL DAILY
Qty: 90 TABLET | Refills: 1 | Status: SHIPPED | OUTPATIENT
Start: 2024-05-29

## 2024-05-29 RX ORDER — NORTRIPTYLINE HYDROCHLORIDE 10 MG/1
10 CAPSULE ORAL NIGHTLY
Qty: 90 CAPSULE | Refills: 1 | Status: SHIPPED | OUTPATIENT
Start: 2024-05-29

## 2024-05-29 RX ORDER — METOPROLOL SUCCINATE 50 MG/1
50 TABLET, EXTENDED RELEASE ORAL 2 TIMES DAILY
Qty: 180 TABLET | Refills: 1 | Status: SHIPPED | OUTPATIENT
Start: 2024-05-29

## 2024-05-29 RX ORDER — ATORVASTATIN CALCIUM 10 MG/1
10 TABLET, FILM COATED ORAL DAILY
Qty: 90 TABLET | Refills: 1 | Status: SHIPPED | OUTPATIENT
Start: 2024-05-29

## 2024-05-29 ASSESSMENT — ENCOUNTER SYMPTOMS
DEPRESSION: 0
LOSS OF SENSATION IN FEET: 1
OCCASIONAL FEELINGS OF UNSTEADINESS: 0

## 2024-05-29 ASSESSMENT — ACTIVITIES OF DAILY LIVING (ADL)
BATHING: INDEPENDENT
DOING_HOUSEWORK: INDEPENDENT
MANAGING_FINANCES: INDEPENDENT
GROCERY_SHOPPING: INDEPENDENT
TAKING_MEDICATION: INDEPENDENT
DRESSING: INDEPENDENT

## 2024-05-29 ASSESSMENT — PATIENT HEALTH QUESTIONNAIRE - PHQ9
SUM OF ALL RESPONSES TO PHQ9 QUESTIONS 1 AND 2: 0
2. FEELING DOWN, DEPRESSED OR HOPELESS: NOT AT ALL
1. LITTLE INTEREST OR PLEASURE IN DOING THINGS: NOT AT ALL

## 2024-05-29 NOTE — PROGRESS NOTES
Chief Complaint: Medicare Wellness Exam/Comprehensive Problem Focused Follow Up and Physical Exam    HPI:  Subjective   Patient ID: Dea Duenas is a 74 y.o. female who presents for Medicare Annual Wellness Visit Subsequent, Follow-up, and Med Refill.  HPI        year old female with past medical history of hypertension, hyperlipidemia, diverticulitis, chronic lumbar radiculopathy, carpal tunnel syndrome of the right hand, and osteoarthritis.  CARI CPAP  Urgent care visit 11/2021 for carpal tunnel of the right hand. Provided with wrist brace at the time.   ER presentation November 3, 2022 respiratory failure secondary to CHF exacerbation 4 to 45% systolic new onset A. fib CT PE negative    Patient states she is overall feeling very well occasionally gets tingling in her bilateral hands over the last 9 months or so persistent grade 3 out of 10 worse when she lays down at night or when her neck muscles get tight around her neck better when she wiggles it and loosens it up during the day  Occasional paresthesias of the toes occasionally  Denies any paralysis joint swelling redness trauma    Overall she states that feels good though        Health Maintenance:      Colonoscopy: 2022 due 2027      Mammogram: 2022      Pelvic/Pap:      Low dose chest CT:      Aorta duplex:      Optho:      Podiatry:        Vaccines:      Prevnar 20:      Prevnar 13:      Pneumovax 23:      Tdap:      Shingrix:      COVID:      Influenza:        ROS:      General: denies fever/chills/weight loss      Head: denies HA/trauma/masses/dizziness      Eyes: denies vision change/loss of vision/blurry vision/diplopia/eye pain      Ears: denies hearing loss/tinnitus/otalgia/otorrhea      Nose: denies nasal drainage/anosmia      Throat: denies dysphagia/odynophagia      Lymphatics: denies lymph node swelling      Cardiac: denies CP/palpitations/orthopnea/PND      Pulmonary: denies dyspnea/cough/wheezing      GI: denies abd  "pain/n/v/diarrhea/melena/hematochezia/hematemesis      : denies dysuria/hematuria/change frequency      Genital: denies genital discharge/lesions      Skin: denies rashes/lesions/masses      MSK: denies weakness/swelling/edema/gait imbalance/pain      Neuro: Occasional paresthesias bilateral hands feet toes denies paresthesias/seizures/dysarthria      Psych: denies depression/anxiety/suicidal or homicidal ideations            Objective   /68   Pulse 81   Ht 1.6 m (5' 3\")   Wt 126 kg (278 lb)   SpO2 98%   BMI 49.25 kg/m²      Physical Exam:     General: AO3, NAD     Head: atraumatic/NC     Eyes: EOMI/PERRLA. Negative APD     Ears: TM pearly gray, EAC clear. No lesions or erythema     Nose: symmetric nares, no discharge     Throat: trachea midline, uvula midline pink mucosa. No thyromegaly     Lymphatics: no cervical/supraclavicular/ant or posterior cervical adenopathy/axillary/inguinal adenopathy     Breast: not examined     Chest: no deformity or tenderness to palpation     Pulm: CTA b/l, no wheeze/rhonchi/rales. nonlabored     Cardiac: RRR +s1s2, no m/r/g.      GI: soft, NT/ND. Normoactive Bsx4. No rebound/guarding.     Rectal: no examined     MSK: Ambulates with cane negative Tinel's bilateral 5/5 strength UE LE. No edema/clubbing/cyanosis     Skin: no rashes/lesions     Vascular: 2+ palp DP PT radials b/l. Negative carotid bruit     Neuro: CNII-XII intact. No focal deficits. Reflexes 2/4 brachioradialis bicep tricep patellar achilles. Finger to nose intact.     Psych: appropriate mood/affect                    No results found for: \"BMPR1A\", \"CBCDIF\"  Patient refused EMG at this time  Patient refused physical therapy at this time    Assessment/Plan   Diagnoses and all orders for this visit:  Healthcare maintenance  -     Vitamin D 25 hydroxy; Future  -     CBC and Auto Differential; Future  -     Comprehensive Metabolic Panel; Future  -     Hemoglobin A1C; Future  -     Lipid Panel; Future  -     " Thyroxine, Free; Future  -     Thyroid Stimulating Hormone; Future  -     Vitamin B12; Future  -     Vitamin B1, Whole Blood; Future  Encounter for general adult medical examination without abnormal findings  -     nortriptyline (Pamelor) 10 mg capsule; Take 1 capsule (10 mg) by mouth once daily at bedtime.  Elevated blood-pressure reading, without diagnosis of hypertension  -     metoprolol succinate XL (Toprol-XL) 50 mg 24 hr tablet; Take 1 tablet (50 mg) by mouth 2 times a day.  Hyperlipidemia, unspecified hyperlipidemia type  -     atorvastatin (Lipitor) 10 mg tablet; Take 1 tablet (10 mg) by mouth once daily.  Unspecified atrial fibrillation (Multi)  -     apixaban (Eliquis) 5 mg tablet; Take 1 tab bid  Primary hypertension  -     lisinopril 10 mg tablet; Take 1 tablet (10 mg) by mouth once daily.  Vitamin D deficiency  -     Vitamin D 25 hydroxy; Future  At risk for impaired health maintenance  -     CBC and Auto Differential; Future  Morbid obesity (Multi)    As you stated this time you prefer to do home physical therapy since this helps the condition that you are having    Cardiology recommendations appreciated maintain current regimen follow-up in 6 to 8 months    Please call and follow-up with pulmonary as ordered    Please call and follow-up with hematology as ordered        Thank you for making appointment today Dea    Please follow-up 6 months     Jan Martin DO, MARK Martin DOFollow up and med refill    Active Problem List  Problem List       Atrial fibrillation (Multi) (Chronic)    Overview Signed 11/3/2023 11:18 AM by Aneta Boyd 11/2022. CHADS Vasc 4. On Eliiquis  CVN 12/9/22 transiently successfule. Loaded wtih amio repeat CVN but unable to maintain sinus. Seen by Dr. HERNANDEZ and ablation not felt to be the correct option         HFrEF (heart failure with reduced ejection fraction) (Multi) (Chronic)    Overview Addendum 2/22/2024 10:15 PM by Guillermo Maria MD     EF 40-45% on  echo of 10/31/2022, 8/26/23 AST EF 61%         Hyperlipidemia (Chronic)    Overview Signed 2/22/2024 10:14 PM by Guillermo Maria MD     High trig         CARI (obstructive sleep apnea) (Chronic)       Comprehensive Medical/Surgical/Social/Family History  Past Medical History:   Diagnosis Date    Atrial fibrillation (Multi) 03/29/2023    New 11/2022. CHADS Vasc 4. On Eliiquis  CVN 12/9/22 transiently successfule. Loaded wtih amio repeat CVN but unable to maintain sinus. Seen by Dr. HERNANDEZ and ablation not felt to be the correct option    Body mass index (BMI) 50.0-59.9, adult (Multi) 04/22/2022    BMI 50.0-59.9, adult    Body mass index (BMI) 50.0-59.9, adult (Multi) 03/17/2022    BMI 50.0-59.9, adult    Essential (primary) hypertension     Benign essential hypertension    HFrEF (heart failure with reduced ejection fraction) (Multi) 03/29/2023    EF 40-45% on echo of 10/31/2022    Hyperlipidemia 03/29/2023    CARI (obstructive sleep apnea) 03/29/2023    Other intervertebral disc displacement, lumbar region     Lumbar herniated disc    Personal history of other diseases of the digestive system     History of diverticulitis    Personal history of other diseases of the digestive system     History of constipation    Personal history of other endocrine, nutritional and metabolic disease     History of mixed hyperlipidemia    Personal history of other endocrine, nutritional and metabolic disease     History of vitamin D deficiency    Personal history of other specified conditions     History of vertigo     Past Surgical History:   Procedure Laterality Date    OTHER SURGICAL HISTORY  12/16/2021    Colonoscopy    OTHER SURGICAL HISTORY  12/16/2021    Knee surgery    OTHER SURGICAL HISTORY  12/16/2021    Partial hysterectomy     Social History     Social History Narrative    Not on file         Allergies and Medications  Enviro stress  Current Outpatient Medications on File Prior to Visit   Medication Sig Dispense Refill     "acetaminophen (Tylenol 8 Hour) 650 mg ER tablet 1-2 tablet EVERY 12 HOURS (route: oral)      apixaban (Eliquis) 5 mg tablet Take 1 tab bid 180 tablet 1    atorvastatin (Lipitor) 10 mg tablet TAKE 1 TABLET BY MOUTH EVERY DAY 90 tablet 0    cetirizine (ZyrTEC) 10 mg tablet Take by mouth.      cholecalciferol (Vitamin D-3) 125 MCG (5000 UT) capsule Take 1 capsule (125 mcg) by mouth once daily.      furosemide (Lasix) 40 mg tablet TAKE 1 TABLET BY MOUTH EVERY DAY 90 tablet 3    levalbuterol (Xopenex) 45 mcg/actuation inhaler Inhale 2 puffs every 4 hours if needed.      lisinopril 10 mg tablet Take 1 tablet (10 mg) by mouth once daily. 90 tablet 1    metoprolol succinate XL (Toprol-XL) 50 mg 24 hr tablet TAKE 1 TABLET BY MOUTH TWICE A  tablet 0    nortriptyline (Pamelor) 10 mg capsule Take 1 capsule (10 mg) by mouth once daily at bedtime. 90 capsule 1    [DISCONTINUED] fluticasone (Flonase) 50 mcg/actuation nasal spray Administer into affected nostril(s).       No current facility-administered medications on file prior to visit.       Medications and Supplements  prescribed by me and other practitioners or clinical pharmacist (such as prescriptions, OTC's, herbal therapies and supplements) were reviewed and documented in the medical record.    Tobacco/Alcohol/Opioid use, as well as Illicit Drug Use was screened for/reviewed and documented in Social History section and medication list as appropriate  Activities of Daily Living  In your present state of health, do you have any difficulty performing the following activities?:   Preparing food and eating?: No  Bathing yourself: No  Getting dressed: No  Using the toilet:No  Moving around from place to place: No  In the past year have you fallen or had a near fall?:No    Depression Screen  (Note: if answer to either of the following is \"Yes\", then a more complete depression screening is indicated)   Q1: Over the past two weeks, have you felt down, depressed or hopeless? " No  Q2: Over the past two weeks, have you felt little interest or pleasure in doing things? No    Current exercise habits: The patient does not participate in regular exercise at present.   Dietary issues discussed: Yes  Hearing difficulties: Yes  Safe in current home environment: yes  Visual Acuity assessed: no  Cognitive Impairment assessed: no       Advance directives  Advanced Care Planning (including a Living Will, Healthcare POA, as well as specific end of life choices and/or directives), was discussed for approximately 16 minutes with the patient and/or surrogate, voluntarily, and documented in the medical record.     Cardiac Risk Assessment  Cardiovascular risk was discussed and, if needed, lifestyle modifications recommended, including nutritional choices, exercise, and elimination of habits contributing to risk. We agreed on a plan to reduce the current cardiovascular risk based on above discussion as needed.  Aspirin use/disuse was discussed after reviewing the updated guidelines below:    Consider low dose Aspirin ( mg) use if the benefit for cardiovascular disease prevention outweighs risk for bleeding complications.   In general, low dose ASA should be considered:  In patients WITHOUT prior MI/stroke/PAD (primary prevention):   a. Age <60: Use if 10-year cardiovascular disease risk >20%, with discussion of risks and benefits with patient  b. Age 60-<70: Use if 10-year cardiovascular disease risk >20% and low bleeding (e.g., gastrointenstinal) risk, with discussion of risks and benefits with patient  c. Age >=70: Do not use    In patients WITH prior MI/stroke/PAD (secondary prevention):   Generally use unless extremely high bleeding (e.g., gastrointenstinal) risk, with discussion of risks and benefits with patient    ROS otherwise negative aside from what was mentioned above in HPI.    Vitals  Vitals:    05/29/24 1143   BP: 104/68   Pulse: 81   SpO2: 98%     Body mass index is 49.25  kg/m².    Assessment and Plan:  Problem List Items Addressed This Visit       Hyperlipidemia (Chronic)    Overview     High trig          Other Visit Diagnoses       Encounter for general adult medical examination without abnormal findings        Elevated blood-pressure reading, without diagnosis of hypertension        Unspecified atrial fibrillation (Multi)        Primary hypertension                    During the course of the visit the patient was educated and counseled about age appropriate screening and preventive services. Completed preventive screenings were documented in the chart and orders were placed for outstanding screenings/procedures as documented in the Assessment and Plan.      Patient Instructions (the written plan) was given to the patient at check out.      Jan Martin, DO

## 2024-06-03 ENCOUNTER — LAB (OUTPATIENT)
Dept: LAB | Facility: LAB | Age: 74
End: 2024-06-03
Payer: MEDICARE

## 2024-06-03 DIAGNOSIS — Z91.89 AT RISK FOR IMPAIRED HEALTH MAINTENANCE: ICD-10-CM

## 2024-06-03 DIAGNOSIS — E55.9 VITAMIN D DEFICIENCY: ICD-10-CM

## 2024-06-03 DIAGNOSIS — Z00.00 HEALTHCARE MAINTENANCE: ICD-10-CM

## 2024-06-03 LAB
25(OH)D3 SERPL-MCNC: 56 NG/ML (ref 30–100)
ALBUMIN SERPL BCP-MCNC: 4.2 G/DL (ref 3.4–5)
ALP SERPL-CCNC: 65 U/L (ref 33–136)
ALT SERPL W P-5'-P-CCNC: 19 U/L (ref 7–45)
ANION GAP SERPL CALC-SCNC: 14 MMOL/L (ref 10–20)
AST SERPL W P-5'-P-CCNC: 16 U/L (ref 9–39)
BASOPHILS # BLD AUTO: 0.03 X10*3/UL (ref 0–0.1)
BASOPHILS NFR BLD AUTO: 0.5 %
BILIRUB SERPL-MCNC: 0.8 MG/DL (ref 0–1.2)
BUN SERPL-MCNC: 21 MG/DL (ref 6–23)
CALCIUM SERPL-MCNC: 9.3 MG/DL (ref 8.6–10.3)
CHLORIDE SERPL-SCNC: 103 MMOL/L (ref 98–107)
CHOLEST SERPL-MCNC: 217 MG/DL (ref 0–199)
CHOLESTEROL/HDL RATIO: 4.6
CO2 SERPL-SCNC: 28 MMOL/L (ref 21–32)
CREAT SERPL-MCNC: 1.02 MG/DL (ref 0.5–1.05)
EGFRCR SERPLBLD CKD-EPI 2021: 59 ML/MIN/1.73M*2
EOSINOPHIL # BLD AUTO: 0.1 X10*3/UL (ref 0–0.4)
EOSINOPHIL NFR BLD AUTO: 1.5 %
ERYTHROCYTE [DISTWIDTH] IN BLOOD BY AUTOMATED COUNT: 14.2 % (ref 11.5–14.5)
EST. AVERAGE GLUCOSE BLD GHB EST-MCNC: 108 MG/DL
GLUCOSE SERPL-MCNC: 95 MG/DL (ref 74–99)
HBA1C MFR BLD: 5.4 %
HCT VFR BLD AUTO: 45.4 % (ref 36–46)
HDLC SERPL-MCNC: 47.5 MG/DL
HGB BLD-MCNC: 14.9 G/DL (ref 12–16)
IMM GRANULOCYTES # BLD AUTO: 0.04 X10*3/UL (ref 0–0.5)
IMM GRANULOCYTES NFR BLD AUTO: 0.6 % (ref 0–0.9)
LDLC SERPL CALC-MCNC: 125 MG/DL
LYMPHOCYTES # BLD AUTO: 2.99 X10*3/UL (ref 0.8–3)
LYMPHOCYTES NFR BLD AUTO: 45.6 %
MCH RBC QN AUTO: 28.7 PG (ref 26–34)
MCHC RBC AUTO-ENTMCNC: 32.8 G/DL (ref 32–36)
MCV RBC AUTO: 87 FL (ref 80–100)
MONOCYTES # BLD AUTO: 0.63 X10*3/UL (ref 0.05–0.8)
MONOCYTES NFR BLD AUTO: 9.6 %
NEUTROPHILS # BLD AUTO: 2.76 X10*3/UL (ref 1.6–5.5)
NEUTROPHILS NFR BLD AUTO: 42.2 %
NON HDL CHOLESTEROL: 170 MG/DL (ref 0–149)
NRBC BLD-RTO: 0 /100 WBCS (ref 0–0)
PLATELET # BLD AUTO: 174 X10*3/UL (ref 150–450)
POTASSIUM SERPL-SCNC: 4.2 MMOL/L (ref 3.5–5.3)
PROT SERPL-MCNC: 6.5 G/DL (ref 6.4–8.2)
RBC # BLD AUTO: 5.2 X10*6/UL (ref 4–5.2)
SODIUM SERPL-SCNC: 141 MMOL/L (ref 136–145)
T4 FREE SERPL-MCNC: 0.92 NG/DL (ref 0.61–1.12)
TRIGL SERPL-MCNC: 222 MG/DL (ref 0–149)
TSH SERPL-ACNC: 1.57 MIU/L (ref 0.44–3.98)
VIT B12 SERPL-MCNC: 313 PG/ML (ref 211–911)
VLDL: 44 MG/DL (ref 0–40)
WBC # BLD AUTO: 6.6 X10*3/UL (ref 4.4–11.3)

## 2024-06-03 PROCEDURE — 82607 VITAMIN B-12: CPT

## 2024-06-03 PROCEDURE — 82306 VITAMIN D 25 HYDROXY: CPT

## 2024-06-03 PROCEDURE — 84443 ASSAY THYROID STIM HORMONE: CPT

## 2024-06-03 PROCEDURE — 84439 ASSAY OF FREE THYROXINE: CPT

## 2024-06-03 PROCEDURE — 80061 LIPID PANEL: CPT

## 2024-06-03 PROCEDURE — 84425 ASSAY OF VITAMIN B-1: CPT

## 2024-06-03 PROCEDURE — 83036 HEMOGLOBIN GLYCOSYLATED A1C: CPT

## 2024-06-03 PROCEDURE — 36415 COLL VENOUS BLD VENIPUNCTURE: CPT

## 2024-06-03 PROCEDURE — 80053 COMPREHEN METABOLIC PANEL: CPT

## 2024-06-03 PROCEDURE — 85025 COMPLETE CBC W/AUTO DIFF WBC: CPT

## 2024-06-07 LAB — VIT B1 PYROPHOSHATE BLD-SCNC: 134 NMOL/L (ref 70–180)

## 2024-06-21 DIAGNOSIS — E78.5 HYPERLIPIDEMIA, UNSPECIFIED HYPERLIPIDEMIA TYPE: Primary | ICD-10-CM

## 2024-06-21 RX ORDER — ATORVASTATIN CALCIUM 20 MG/1
20 TABLET, FILM COATED ORAL DAILY
Qty: 90 TABLET | Refills: 1 | Status: SHIPPED | OUTPATIENT
Start: 2024-06-21 | End: 2024-09-19

## 2024-08-26 PROBLEM — M54.9 CHRONIC BACK PAIN: Status: RESOLVED | Noted: 2023-03-29 | Resolved: 2024-08-26

## 2024-08-26 PROBLEM — M99.02 SEGMENTAL AND SOMATIC DYSFUNCTION OF THORACIC REGION: Status: RESOLVED | Noted: 2023-03-29 | Resolved: 2024-08-26

## 2024-08-26 PROBLEM — M99.05 SEGMENTAL AND SOMATIC DYSFUNCTION OF PELVIC REGION: Status: RESOLVED | Noted: 2023-03-29 | Resolved: 2024-08-26

## 2024-08-26 PROBLEM — M54.50 CHRONIC BILATERAL LOW BACK PAIN WITHOUT SCIATICA: Status: RESOLVED | Noted: 2023-03-29 | Resolved: 2024-08-26

## 2024-08-26 PROBLEM — M17.12 ARTHRITIS OF KNEE, LEFT: Status: RESOLVED | Noted: 2023-03-29 | Resolved: 2024-08-26

## 2024-08-26 PROBLEM — G89.29 CHRONIC BILATERAL LOW BACK PAIN WITHOUT SCIATICA: Status: RESOLVED | Noted: 2023-03-29 | Resolved: 2024-08-26

## 2024-08-26 PROBLEM — M99.04 SACROILIAC JOINT SOMATIC DYSFUNCTION: Status: RESOLVED | Noted: 2023-03-29 | Resolved: 2024-08-26

## 2024-08-26 PROBLEM — M25.562 KNEE PAIN, LEFT: Status: RESOLVED | Noted: 2023-03-29 | Resolved: 2024-08-26

## 2024-08-26 PROBLEM — M99.03 LUMBOSACRAL DYSFUNCTION: Status: RESOLVED | Noted: 2023-03-29 | Resolved: 2024-08-26

## 2024-08-26 PROBLEM — G89.29 CHRONIC BACK PAIN: Status: RESOLVED | Noted: 2023-03-29 | Resolved: 2024-08-26

## 2024-08-26 PROBLEM — L98.9 SKIN LESION: Status: RESOLVED | Noted: 2023-03-29 | Resolved: 2024-08-26

## 2024-08-27 ENCOUNTER — APPOINTMENT (OUTPATIENT)
Dept: CARDIOLOGY | Facility: CLINIC | Age: 74
End: 2024-08-27
Payer: MEDICARE

## 2024-08-27 VITALS
BODY MASS INDEX: 48.71 KG/M2 | HEART RATE: 74 BPM | DIASTOLIC BLOOD PRESSURE: 78 MMHG | WEIGHT: 275 LBS | SYSTOLIC BLOOD PRESSURE: 126 MMHG | OXYGEN SATURATION: 94 %

## 2024-08-27 DIAGNOSIS — E78.2 MIXED HYPERLIPIDEMIA: Chronic | ICD-10-CM

## 2024-08-27 DIAGNOSIS — I50.9 OTHER CONGESTIVE HEART FAILURE (MULTI): ICD-10-CM

## 2024-08-27 DIAGNOSIS — I50.20 HFREF (HEART FAILURE WITH REDUCED EJECTION FRACTION) (MULTI): Chronic | ICD-10-CM

## 2024-08-27 DIAGNOSIS — E78.5 HYPERLIPIDEMIA, UNSPECIFIED HYPERLIPIDEMIA TYPE: ICD-10-CM

## 2024-08-27 DIAGNOSIS — R03.0 BLOOD PRESSURE ELEVATED WITHOUT HISTORY OF HTN: ICD-10-CM

## 2024-08-27 DIAGNOSIS — G47.33 OSA (OBSTRUCTIVE SLEEP APNEA): Chronic | ICD-10-CM

## 2024-08-27 DIAGNOSIS — E66.01 MORBID OBESITY (MULTI): ICD-10-CM

## 2024-08-27 DIAGNOSIS — I48.0 PAROXYSMAL ATRIAL FIBRILLATION (MULTI): Primary | Chronic | ICD-10-CM

## 2024-08-27 PROCEDURE — 1123F ACP DISCUSS/DSCN MKR DOCD: CPT | Performed by: INTERNAL MEDICINE

## 2024-08-27 PROCEDURE — 99214 OFFICE O/P EST MOD 30 MIN: CPT | Performed by: INTERNAL MEDICINE

## 2024-08-27 PROCEDURE — 1159F MED LIST DOCD IN RCRD: CPT | Performed by: INTERNAL MEDICINE

## 2024-08-27 RX ORDER — ATORVASTATIN CALCIUM 40 MG/1
40 TABLET, FILM COATED ORAL DAILY
Qty: 90 TABLET | Refills: 3 | Status: SHIPPED | OUTPATIENT
Start: 2024-08-27 | End: 2025-08-27

## 2024-08-27 RX ORDER — ATORVASTATIN CALCIUM 40 MG/1
40 TABLET, FILM COATED ORAL DAILY
Qty: 90 TABLET | Refills: 3 | Status: SHIPPED | OUTPATIENT
Start: 2024-08-27 | End: 2024-08-27 | Stop reason: SDUPTHER

## 2024-08-27 NOTE — PROGRESS NOTES
Referred by No ref. provider found    HPI   I am seeing Dea for a 6-month follow-up.  She is feeling great.  No chest pain or pressure no shortness of breath no palpitations no episodes of A-fib that she knows of.  Past Medical History:  Problem List Items Addressed This Visit    None       Past Medical History:   Diagnosis Date    Atrial fibrillation (Multi) 03/29/2023    New 11/2022. CHADS Vasc 4. On Eliiquis  CVN 12/9/22 transiently successfule. Loaded wtih amio repeat CVN but unable to maintain sinus. Seen by Dr. HERNANDEZ and ablation not felt to be the correct option    Body mass index (BMI) 50.0-59.9, adult (Multi) 04/22/2022    BMI 50.0-59.9, adult    Body mass index (BMI) 50.0-59.9, adult (Multi) 03/17/2022    BMI 50.0-59.9, adult    Essential (primary) hypertension     Benign essential hypertension    HFrEF (heart failure with reduced ejection fraction) (Multi) 03/29/2023    EF 40-45% on echo of 10/31/2022    Hyperlipidemia 03/29/2023    CARI (obstructive sleep apnea) 03/29/2023    Other intervertebral disc displacement, lumbar region     Lumbar herniated disc    Personal history of other diseases of the digestive system     History of diverticulitis    Personal history of other diseases of the digestive system     History of constipation    Personal history of other endocrine, nutritional and metabolic disease     History of mixed hyperlipidemia    Personal history of other endocrine, nutritional and metabolic disease     History of vitamin D deficiency    Personal history of other specified conditions     History of vertigo             Past Surgical History:  She has a past surgical history that includes Other surgical history (12/16/2021); Other surgical history (12/16/2021); and Other surgical history (12/16/2021).      Social History:  She reports that she has never smoked. She has never used smokeless tobacco. She reports that she does not currently use alcohol. She reports that she does not use  drugs.    Family History:  Family History   Problem Relation Name Age of Onset    Colon cancer Mother      Rectal cancer Mother      Cirrhosis Father      Lung cancer Father      Heart attack Father      Colon cancer Mother's Sister       Allergies:  Enviro stress    Outpatient Medications:  Current Outpatient Medications   Medication Instructions    acetaminophen (Tylenol 8 Hour) 650 mg ER tablet 1-2 tablet EVERY 12 HOURS (route: oral)    apixaban (Eliquis) 5 mg tablet Take 1 tab bid    atorvastatin (LIPITOR) 20 mg, oral, Daily    cetirizine (ZyrTEC) 10 mg tablet oral    cholecalciferol (Vitamin D-3) 125 MCG (5000 UT) capsule 1 capsule, oral, Daily    furosemide (LASIX) 40 mg, oral, Daily    levalbuterol (Xopenex) 45 mcg/actuation inhaler 2 puffs, inhalation, Every 4 hours PRN    lisinopril 10 mg, oral, Daily    metoprolol succinate XL (TOPROL-XL) 50 mg, oral, 2 times daily    nortriptyline (PAMELOR) 10 mg, oral, Nightly     Last Recorded Vitals:  There were no vitals filed for this visit.    Physical Exam  Patient is alert and oriented x3.  HEENT is unremarkable mucous members are moist  Neck no JVP no bruits upstrokes are full no thyromegaly  Lungs are clear bilaterally.  No wheezing crackles or rales  Heart regular rhythm normal S1-S2 there is no S3 no murmurs are heard.  Abdomen is soft bs are positive nontender nondistended no organomegaly no pulsatile masses  Extremities have no edema.  Distal pulses present palpable.  Neuro is grossly nonfocal  Skin has no rashes     Last Labs:  CBC -  Lab Results   Component Value Date    WBC 6.6 06/03/2024    HGB 14.9 06/03/2024    HCT 45.4 06/03/2024    MCV 87 06/03/2024     06/03/2024     CMP -  Lab Results   Component Value Date    CALCIUM 9.3 06/03/2024    PHOS 4.6 11/03/2022    PROT 6.5 06/03/2024    ALBUMIN 4.2 06/03/2024    AST 16 06/03/2024    ALT 19 06/03/2024    ALKPHOS 65 06/03/2024    BILITOT 0.8 06/03/2024     LIPID PANEL -   Lab Results   Component  Value Date    CHOL 217 (H) 06/03/2024    HDL 47.5 06/03/2024    CHHDL 4.6 06/03/2024    VLDL 44 (H) 06/03/2024    TRIG 222 (H) 06/03/2024    NHDL 170 (H) 06/03/2024     RENAL FUNCTION PANEL -   Lab Results   Component Value Date    K 4.2 06/03/2024    PHOS 4.6 11/03/2022     Lab Results   Component Value Date     (H) 11/30/2022    HGBA1C 5.4 06/03/2024     Procedure    AST 8/21/23 NL, EF 61%    ECHO [10/31/2022]: EF 40-45%. Global hypok.          Assessment/Plan     1. Paroxysmal atrial fibrillation. Chads vascular score 4. Continue Eliquis.  Doing well since she started her CPAP for sleep apnea.  Significant minor episodes of A-fib in 2022 are related to untreated sleep apnea    2. Respiratory failure. She was on oxygen. Recently she has not been using her oxygen and she feels terrific. She does have severe sleep apnea and fortunately is using her CPAP.     3. Cardiomyopathy. Likely nonischemic.  At the time of her A-fib her ejection fraction on echo was 40 to 45%.  Her last stress test August 2023 was regadenoson and revealed normal perfusion ejection fraction 61%    4. Hyperlipidemia. She is on lovastatin. This is followed by Dr. Martin.  6/3/2024  HDL 48 triglycerides 222 (down from 472).  This is on atorvastatin 20.  Not acceptable.  Increase the dose to 40 mg.  Follow-up with Dr. Martin for routine blood work.  Target LDL certainly less than 100 closer to 70.    RTC 6 months    Guillermo Maria MD     Instructions and follow up

## 2024-08-27 NOTE — PATIENT INSTRUCTIONS
1. Paroxysmal atrial fibrillation. Chads vascular score 4. Continue Eliquis.  Doing well since she started her CPAP for sleep apnea.  Significant minor episodes of A-fib in 2022 are related to untreated sleep apnea    2. Respiratory failure. She was on oxygen. Recently she has not been using her oxygen and she feels terrific. She does have severe sleep apnea and fortunately is using her CPAP.     3. Cardiomyopathy. Likely nonischemic.  At the time of her A-fib her ejection fraction on echo was 40 to 45%.  Her last stress test August 2023 was regadenoson and revealed normal perfusion ejection fraction 61%    4. Hyperlipidemia. She is on lovastatin. This is followed by Dr. Martin.  6/3/2024  HDL 48 triglycerides 222 (down from 472).  This is on atorvastatin 20.  Not acceptable.  Increase the dose to 40 mg.  Follow-up with Dr. Martin for routine blood work.  Target LDL certainly less than 100 closer to 70.    RTC 6 months

## 2024-09-10 ENCOUNTER — TELEPHONE (OUTPATIENT)
Dept: CARDIOLOGY | Facility: CLINIC | Age: 74
End: 2024-09-10

## 2024-09-10 NOTE — TELEPHONE ENCOUNTER
Pt was just seen by Dr Maria on 8/27. Pt thought she was taking 20mg of atorvastatin but once she got home she realized that she was only taking 10mg. She is wondering if she should increase to 20mg instead of 40mg?

## 2024-11-30 ENCOUNTER — TELEPHONE (OUTPATIENT)
Dept: PRIMARY CARE | Facility: CLINIC | Age: 74
End: 2024-11-30
Payer: MEDICARE

## 2024-11-30 DIAGNOSIS — Z00.00 ENCOUNTER FOR GENERAL ADULT MEDICAL EXAMINATION WITHOUT ABNORMAL FINDINGS: ICD-10-CM

## 2024-12-03 ENCOUNTER — APPOINTMENT (OUTPATIENT)
Dept: PRIMARY CARE | Facility: CLINIC | Age: 74
End: 2024-12-03
Payer: MEDICARE

## 2024-12-04 RX ORDER — NORTRIPTYLINE HYDROCHLORIDE 10 MG/1
10 CAPSULE ORAL NIGHTLY
Qty: 90 CAPSULE | Refills: 0 | Status: SHIPPED | OUTPATIENT
Start: 2024-12-04

## 2025-01-07 DIAGNOSIS — I48.91 UNSPECIFIED ATRIAL FIBRILLATION (MULTI): ICD-10-CM

## 2025-01-28 ENCOUNTER — APPOINTMENT (OUTPATIENT)
Dept: PRIMARY CARE | Facility: CLINIC | Age: 75
End: 2025-01-28
Payer: MEDICARE

## 2025-01-28 VITALS — DIASTOLIC BLOOD PRESSURE: 70 MMHG | BODY MASS INDEX: 48.71 KG/M2 | SYSTOLIC BLOOD PRESSURE: 110 MMHG | WEIGHT: 275 LBS

## 2025-01-28 DIAGNOSIS — I48.91 UNSPECIFIED ATRIAL FIBRILLATION (MULTI): ICD-10-CM

## 2025-01-28 DIAGNOSIS — Z79.899 HIGH RISK MEDICATION USE: ICD-10-CM

## 2025-01-28 DIAGNOSIS — R03.0 ELEVATED BLOOD-PRESSURE READING, WITHOUT DIAGNOSIS OF HYPERTENSION: ICD-10-CM

## 2025-01-28 DIAGNOSIS — I10 PRIMARY HYPERTENSION: ICD-10-CM

## 2025-01-28 DIAGNOSIS — I48.0 PAROXYSMAL ATRIAL FIBRILLATION (MULTI): Chronic | ICD-10-CM

## 2025-01-28 DIAGNOSIS — Z12.31 OTHER SCREENING MAMMOGRAM: Primary | ICD-10-CM

## 2025-01-28 DIAGNOSIS — Z00.00 ENCOUNTER FOR GENERAL ADULT MEDICAL EXAMINATION WITHOUT ABNORMAL FINDINGS: ICD-10-CM

## 2025-01-28 DIAGNOSIS — E55.9 VITAMIN D DEFICIENCY: ICD-10-CM

## 2025-01-28 DIAGNOSIS — I50.9 HEART FAILURE, UNSPECIFIED: ICD-10-CM

## 2025-01-28 DIAGNOSIS — E78.5 HYPERLIPIDEMIA, UNSPECIFIED HYPERLIPIDEMIA TYPE: ICD-10-CM

## 2025-01-28 DIAGNOSIS — N18.31 CHRONIC KIDNEY DISEASE, STAGE 3A (MULTI): ICD-10-CM

## 2025-01-28 DIAGNOSIS — Z00.00 HEALTHCARE MAINTENANCE: ICD-10-CM

## 2025-01-28 DIAGNOSIS — T78.40XS ALLERGY, SEQUELA: ICD-10-CM

## 2025-01-28 DIAGNOSIS — J98.01 BRONCHOSPASM: ICD-10-CM

## 2025-01-28 PROCEDURE — 1160F RVW MEDS BY RX/DR IN RCRD: CPT | Performed by: INTERNAL MEDICINE

## 2025-01-28 PROCEDURE — 99213 OFFICE O/P EST LOW 20 MIN: CPT | Performed by: INTERNAL MEDICINE

## 2025-01-28 PROCEDURE — 1036F TOBACCO NON-USER: CPT | Performed by: INTERNAL MEDICINE

## 2025-01-28 PROCEDURE — 3074F SYST BP LT 130 MM HG: CPT | Performed by: INTERNAL MEDICINE

## 2025-01-28 PROCEDURE — 1159F MED LIST DOCD IN RCRD: CPT | Performed by: INTERNAL MEDICINE

## 2025-01-28 PROCEDURE — G2211 COMPLEX E/M VISIT ADD ON: HCPCS | Performed by: INTERNAL MEDICINE

## 2025-01-28 PROCEDURE — 1158F ADVNC CARE PLAN TLK DOCD: CPT | Performed by: INTERNAL MEDICINE

## 2025-01-28 PROCEDURE — 3078F DIAST BP <80 MM HG: CPT | Performed by: INTERNAL MEDICINE

## 2025-01-28 PROCEDURE — 1123F ACP DISCUSS/DSCN MKR DOCD: CPT | Performed by: INTERNAL MEDICINE

## 2025-01-28 RX ORDER — FUROSEMIDE 40 MG/1
40 TABLET ORAL DAILY
Qty: 90 TABLET | Refills: 1 | Status: SHIPPED | OUTPATIENT
Start: 2025-01-28

## 2025-01-28 RX ORDER — VIT C/E/ZN/COPPR/LUTEIN/ZEAXAN 250MG-90MG
125 CAPSULE ORAL DAILY
Qty: 90 CAPSULE | Refills: 1 | Status: SHIPPED | OUTPATIENT
Start: 2025-01-28 | End: 2025-04-28

## 2025-01-28 RX ORDER — LISINOPRIL 10 MG/1
10 TABLET ORAL DAILY
Qty: 90 TABLET | Refills: 1 | Status: SHIPPED | OUTPATIENT
Start: 2025-01-28

## 2025-01-28 RX ORDER — NORTRIPTYLINE HYDROCHLORIDE 10 MG/1
10 CAPSULE ORAL NIGHTLY
Qty: 90 CAPSULE | Refills: 1 | Status: SHIPPED | OUTPATIENT
Start: 2025-01-28

## 2025-01-28 RX ORDER — CETIRIZINE HYDROCHLORIDE 10 MG/1
10 TABLET ORAL DAILY PRN
Qty: 90 TABLET | Refills: 1 | Status: SHIPPED | OUTPATIENT
Start: 2025-01-28 | End: 2025-04-28

## 2025-01-28 RX ORDER — LEVALBUTEROL TARTRATE 45 UG/1
2 AEROSOL, METERED ORAL EVERY 4 HOURS PRN
Qty: 15 G | Refills: 3 | Status: SHIPPED | OUTPATIENT
Start: 2025-01-28

## 2025-01-28 RX ORDER — ATORVASTATIN CALCIUM 40 MG/1
40 TABLET, FILM COATED ORAL DAILY
Qty: 90 TABLET | Refills: 1 | Status: SHIPPED | OUTPATIENT
Start: 2025-01-28 | End: 2025-04-28

## 2025-01-28 RX ORDER — METOPROLOL SUCCINATE 50 MG/1
50 TABLET, EXTENDED RELEASE ORAL 2 TIMES DAILY
Qty: 180 TABLET | Refills: 1 | Status: SHIPPED | OUTPATIENT
Start: 2025-01-28

## 2025-01-28 NOTE — PROGRESS NOTES
Chief Complaint:     HPI:  Subjective   Patient ID: Dea Duenas is a 74 y.o. female who presents for Follow-up and Med Refill.  HPI        year old female with past medical history of hypertension, hyperlipidemia, diverticulitis, chronic lumbar radiculopathy, carpal tunnel syndrome of the right hand, and osteoarthritis.  CARI CPAP  Urgent care visit 11/2021 for carpal tunnel of the right hand. Provided with wrist brace at the time.   ER presentation November 3, 2022 respiratory failure secondary to CHF exacerbation 4 to 45% systolic new onset A. fib CT PE negative    Patient states overall she is feeling well and healthy no active issues here for medication refill        Health Maintenance:      Colonoscopy: 2022 due 2027      Mammogram: 2022      Pelvic/Pap:      Low dose chest CT:      Aorta duplex:      Optho:      Podiatry:        Vaccines:      Prevnar 20:      Prevnar 13:      Pneumovax 23:      Tdap:      Shingrix:      COVID:      Influenza:        ROS:      General: denies fever/chills/weight loss      Head: denies HA/trauma/masses/dizziness      Eyes: denies vision change/loss of vision/blurry vision/diplopia/eye pain      Ears: denies hearing loss/tinnitus/otalgia/otorrhea      Nose: denies nasal drainage/anosmia      Throat: denies dysphagia/odynophagia      Lymphatics: denies lymph node swelling      Cardiac: denies CP/palpitations/orthopnea/PND      Pulmonary: denies dyspnea/cough/wheezing      GI: Occasional constipation gets better with 1 Colace a day denies abd pain/n/v/diarrhea/melena/hematochezia/hematemesis      : denies dysuria/hematuria/change frequency      Genital: denies genital discharge/lesions      Skin: denies rashes/lesions/masses      MSK: denies weakness/swelling/edema/gait imbalance/pain      Neuro: Occasional paresthesias bilateral hands feet toes resolved at present denies paresthesias/seizures/dysarthria      Psych: denies depression/anxiety/suicidal or homicidal  "ideations            Objective   /70   Wt 125 kg (275 lb)   BMI 48.71 kg/m²      Physical Exam:     General: AO3, NAD     Head: atraumatic/NC     Eyes: EOMI/PERRLA. Negative APD     Ears: TM pearly gray, EAC clear. No lesions or erythema     Nose: symmetric nares, no discharge     Throat: trachea midline, uvula midline pink mucosa. No thyromegaly     Lymphatics: no cervical/supraclavicular/ant or posterior cervical adenopathy/axillary/inguinal adenopathy     Breast: not examined     Chest: no deformity or tenderness to palpation     Pulm: CTA b/l, no wheeze/rhonchi/rales. nonlabored     Cardiac: RRR +s1s2, no m/r/g.      GI: soft, NT/ND. Normoactive Bsx4. No rebound/guarding.     Rectal: no examined     MSK: Ambulates with cane negative Tinel's bilateral 5/5 strength UE LE. No edema/clubbing/cyanosis     Skin: no rashes/lesions     Vascular: 2+ palp DP PT radials b/l. Negative carotid bruit     Neuro: CNII-XII intact. No focal deficits. Reflexes 2/4 brachioradialis bicep tricep patellar achilles. Finger to nose intact.     Psych: appropriate mood/affect                    No results found for: \"BMPR1A\", \"CBCDIF\"  Patient refused EMG at this time  Patient refused physical therapy at this time    Assessment/Plan   Diagnoses and all orders for this visit:  Other screening mammogram  -     BI mammo bilateral screening tomosynthesis; Future  Unspecified atrial fibrillation (Multi)  -     apixaban (Eliquis) 5 mg tablet; Take 1 tablet (5 mg) by mouth 2 times a day. Take 1 tab bid  -     CBC and Auto Differential; Future  -     Comprehensive Metabolic Panel; Future  -     Hemoglobin A1C; Future  -     Thyroxine, Free; Future  -     Thyroid Stimulating Hormone; Future  -     Lipid Panel; Future  -     Vitamin D 25 hydroxy; Future  -     Vitamin B12; Future  Hyperlipidemia, unspecified hyperlipidemia type  -     atorvastatin (Lipitor) 40 mg tablet; Take 1 tablet (40 mg) by mouth once daily.  -     Lipid Panel; " Future  Heart failure, unspecified  -     furosemide (Lasix) 40 mg tablet; Take 1 tablet (40 mg) by mouth once daily.  -     Comprehensive Metabolic Panel; Future  Primary hypertension  -     lisinopril 10 mg tablet; Take 1 tablet (10 mg) by mouth once daily.  -     Comprehensive Metabolic Panel; Future  Elevated blood-pressure reading, without diagnosis of hypertension  -     metoprolol succinate XL (Toprol-XL) 50 mg 24 hr tablet; Take 1 tablet (50 mg) by mouth 2 times a day.  Encounter for general adult medical examination without abnormal findings  -     nortriptyline (Pamelor) 10 mg capsule; Take 1 capsule (10 mg) by mouth once daily at bedtime.  Allergy, sequela  -     cetirizine (ZyrTEC) 10 mg tablet; Take 1 tablet (10 mg) by mouth once daily as needed for allergies.  Vitamin D deficiency  -     cholecalciferol (Vitamin D-3) 125 mcg (5000 UT) capsule; Take 1 capsule (125 mcg) by mouth once daily.  -     Vitamin D 25 hydroxy; Future  Bronchospasm  -     levalbuterol (Xopenex) 45 mcg/actuation inhaler; Inhale 2 puffs every 4 hours if needed for shortness of breath.  Healthcare maintenance  -     CBC and Auto Differential; Future  -     Comprehensive Metabolic Panel; Future  -     Hemoglobin A1C; Future  -     Thyroxine, Free; Future  -     Thyroid Stimulating Hormone; Future  -     Lipid Panel; Future  -     Vitamin D 25 hydroxy; Future  -     Vitamin B12; Future  High risk medication use  -     Hemoglobin A1C; Future  Paroxysmal atrial fibrillation (Multi)  Chronic kidney disease, stage 3a (Multi)  Body mass index (BMI) 50.0-59.9, adult (Multi)    As you stated this time you prefer to do home physical therapy since this helps the condition that you are having    Cardiology recommendations appreciated Eliquis CPAP increase atorvastatin 40 mg a day follow-up in 6 months    Please call and follow-up with pulmonary as ordered you stated you saw Dr Alexander at [facility] but there is no records available on the  chart    Please call and follow-up with hematology as ordered    Screening blood work due June 2025    Thank you for making appointment today Dea    Please follow-up 6 months     Jan Martin DO, MARK Martin DOFollow up and med refill    Active Problem List  Problem List       Atrial fibrillation (Multi) (Chronic)    Overview Signed 11/3/2023 11:18 AM by Aneta Beard     New 11/2022. CHADS Vasc 4. On Eliiquis  CVN 12/9/22 transiently successfule. Loaded wtih amio repeat CVN but unable to maintain sinus. Seen by Dr. HERNANDEZ and ablation not felt to be the correct option         HFrEF (heart failure with reduced ejection fraction) (Multi) (Chronic)    Overview Addendum 2/22/2024 10:15 PM by Guillermo Maria MD     EF 40-45% on echo of 10/31/2022, 8/26/23 AST EF 61%         Hyperlipidemia (Chronic)    Overview Signed 2/22/2024 10:14 PM by Guillermo Maria MD     High trig         CARI (obstructive sleep apnea) (Chronic)       Comprehensive Medical/Surgical/Social/Family History  Past Medical History:   Diagnosis Date    Atrial fibrillation (Multi) 03/29/2023    New 11/2022. CHADS Vasc 4. On Eliiquis  CVN 12/9/22 transiently successfule. Loaded wtih amio repeat CVN but unable to maintain sinus. Seen by Dr. HERNANDEZ and ablation not felt to be the correct option    Body mass index (BMI) 50.0-59.9, adult (Multi) 04/22/2022    BMI 50.0-59.9, adult    Body mass index (BMI) 50.0-59.9, adult (Multi) 03/17/2022    BMI 50.0-59.9, adult    Essential (primary) hypertension     Benign essential hypertension    HFrEF (heart failure with reduced ejection fraction) 03/29/2023    EF 40-45% on echo of 10/31/2022    Hyperlipidemia 03/29/2023    CARI (obstructive sleep apnea) 03/29/2023    Other intervertebral disc displacement, lumbar region     Lumbar herniated disc    Personal history of other diseases of the digestive system     History of diverticulitis    Personal history of other diseases of the digestive system     History of  constipation    Personal history of other endocrine, nutritional and metabolic disease     History of mixed hyperlipidemia    Personal history of other endocrine, nutritional and metabolic disease     History of vitamin D deficiency    Personal history of other specified conditions     History of vertigo     Past Surgical History:   Procedure Laterality Date    OTHER SURGICAL HISTORY  12/16/2021    Colonoscopy    OTHER SURGICAL HISTORY  12/16/2021    Knee surgery    OTHER SURGICAL HISTORY  12/16/2021    Partial hysterectomy     Social History     Social History Narrative    Not on file         Allergies and Medications  Enviro stress  Current Outpatient Medications on File Prior to Visit   Medication Sig Dispense Refill    acetaminophen (Tylenol 8 Hour) 650 mg ER tablet 1-2 tablet EVERY 12 HOURS (route: oral)      [DISCONTINUED] apixaban (Eliquis) 5 mg tablet Take 1 tab bid 60 tablet 0    [DISCONTINUED] atorvastatin (Lipitor) 40 mg tablet Take 1 tablet (40 mg) by mouth once daily. 90 tablet 3    [DISCONTINUED] cetirizine (ZyrTEC) 10 mg tablet Take by mouth.      [DISCONTINUED] cholecalciferol (Vitamin D-3) 125 MCG (5000 UT) capsule Take 1 capsule (125 mcg) by mouth once daily.      [DISCONTINUED] furosemide (Lasix) 40 mg tablet TAKE 1 TABLET BY MOUTH EVERY DAY 90 tablet 3    [DISCONTINUED] levalbuterol (Xopenex) 45 mcg/actuation inhaler Inhale 2 puffs every 4 hours if needed.      [DISCONTINUED] lisinopril 10 mg tablet Take 1 tablet (10 mg) by mouth once daily. 90 tablet 1    [DISCONTINUED] metoprolol succinate XL (Toprol-XL) 50 mg 24 hr tablet Take 1 tablet (50 mg) by mouth 2 times a day. 180 tablet 1    [DISCONTINUED] nortriptyline (Pamelor) 10 mg capsule TAKE 1 CAPSULE (10 MG) BY MOUTH ONCE DAILY AT BEDTIME. 90 capsule 0     No current facility-administered medications on file prior to visit.       Medications and Supplements  prescribed by me and other practitioners or clinical pharmacist (such as prescriptions,  "OTC's, herbal therapies and supplements) were reviewed and documented in the medical record.    Tobacco/Alcohol/Opioid use, as well as Illicit Drug Use was screened for/reviewed and documented in Social History section and medication list as appropriate  Activities of Daily Living  In your present state of health, do you have any difficulty performing the following activities?:   Preparing food and eating?: No  Bathing yourself: No  Getting dressed: No  Using the toilet:No  Moving around from place to place: No  In the past year have you fallen or had a near fall?:No    Depression Screen  (Note: if answer to either of the following is \"Yes\", then a more complete depression screening is indicated)   Q1: Over the past two weeks, have you felt down, depressed or hopeless? No  Q2: Over the past two weeks, have you felt little interest or pleasure in doing things? No    Current exercise habits: The patient does not participate in regular exercise at present.   Dietary issues discussed: Yes  Hearing difficulties: Yes  Safe in current home environment: yes  Visual Acuity assessed: no  Cognitive Impairment assessed: no       Advance directives  Advanced Care Planning (including a Living Will, Healthcare POA, as well as specific end of life choices and/or directives), was discussed for approximately 16 minutes with the patient and/or surrogate, voluntarily, and documented in the medical record.     Cardiac Risk Assessment  Cardiovascular risk was discussed and, if needed, lifestyle modifications recommended, including nutritional choices, exercise, and elimination of habits contributing to risk. We agreed on a plan to reduce the current cardiovascular risk based on above discussion as needed.  Aspirin use/disuse was discussed after reviewing the updated guidelines below:    Consider low dose Aspirin ( mg) use if the benefit for cardiovascular disease prevention outweighs risk for bleeding complications.   In general, " low dose ASA should be considered:  In patients WITHOUT prior MI/stroke/PAD (primary prevention):   a. Age <60: Use if 10-year cardiovascular disease risk >20%, with discussion of risks and benefits with patient  b. Age 60-<70: Use if 10-year cardiovascular disease risk >20% and low bleeding (e.g., gastrointenstinal) risk, with discussion of risks and benefits with patient  c. Age >=70: Do not use    In patients WITH prior MI/stroke/PAD (secondary prevention):   Generally use unless extremely high bleeding (e.g., gastrointenstinal) risk, with discussion of risks and benefits with patient    ROS otherwise negative aside from what was mentioned above in HPI.    Vitals  Vitals:    01/28/25 1207   BP: 110/70     Body mass index is 48.71 kg/m².    Assessment and Plan:  Problem List Items Addressed This Visit       Atrial fibrillation (Multi) (Chronic)    Overview     New 11/2022. CHADS Vasc 4. On Eliiquis  CVN 12/9/22 transiently successfule. Loaded wtih amio repeat CVN but unable to maintain sinus. Seen by Dr. HERNANDEZ and ablation not felt to be the correct option         Relevant Medications    metoprolol succinate XL (Toprol-XL) 50 mg 24 hr tablet    Hyperlipidemia (Chronic)    Overview     High trig         Relevant Medications    atorvastatin (Lipitor) 40 mg tablet    Other Relevant Orders    Lipid Panel    Morbid obesity (Multi)    Current Assessment & Plan     Increase exercise 30 minutes a day         Chronic kidney disease, stage 3a (Multi)    Current Assessment & Plan     Track BMP at least yearly          Other Visit Diagnoses       Other screening mammogram    -  Primary    Relevant Orders    BI mammo bilateral screening tomosynthesis    Unspecified atrial fibrillation (Multi)        Relevant Medications    apixaban (Eliquis) 5 mg tablet    metoprolol succinate XL (Toprol-XL) 50 mg 24 hr tablet    Other Relevant Orders    CBC and Auto Differential    Comprehensive Metabolic Panel    Hemoglobin A1C    Thyroxine,  Free    Thyroid Stimulating Hormone    Lipid Panel    Vitamin D 25 hydroxy    Vitamin B12    Heart failure, unspecified        Relevant Medications    furosemide (Lasix) 40 mg tablet    metoprolol succinate XL (Toprol-XL) 50 mg 24 hr tablet    Other Relevant Orders    Comprehensive Metabolic Panel    Primary hypertension        Relevant Medications    lisinopril 10 mg tablet    Other Relevant Orders    Comprehensive Metabolic Panel    Elevated blood-pressure reading, without diagnosis of hypertension        Relevant Medications    metoprolol succinate XL (Toprol-XL) 50 mg 24 hr tablet    Encounter for general adult medical examination without abnormal findings        Relevant Medications    nortriptyline (Pamelor) 10 mg capsule    Allergy, sequela        Relevant Medications    cetirizine (ZyrTEC) 10 mg tablet    Vitamin D deficiency        Relevant Medications    cholecalciferol (Vitamin D-3) 125 mcg (5000 UT) capsule    Other Relevant Orders    Vitamin D 25 hydroxy    Bronchospasm        Relevant Medications    levalbuterol (Xopenex) 45 mcg/actuation inhaler    Healthcare maintenance        Relevant Orders    CBC and Auto Differential    Comprehensive Metabolic Panel    Hemoglobin A1C    Thyroxine, Free    Thyroid Stimulating Hormone    Lipid Panel    Vitamin D 25 hydroxy    Vitamin B12    High risk medication use        Relevant Orders    Hemoglobin A1C                During the course of the visit the patient was educated and counseled about age appropriate screening and preventive services. Completed preventive screenings were documented in the chart and orders were placed for outstanding screenings/procedures as documented in the Assessment and Plan.      Patient Instructions (the written plan) was given to the patient at check out.      Jan Martin DO

## 2025-02-12 PROBLEM — I50.9 CHF (CONGESTIVE HEART FAILURE): Status: RESOLVED | Noted: 2023-03-29 | Resolved: 2025-02-12

## 2025-02-12 PROBLEM — M51.26 HERNIATED LUMBAR INTERVERTEBRAL DISC: Status: ACTIVE | Noted: 2025-02-12

## 2025-02-12 PROBLEM — G56.00 CARPAL TUNNEL SYNDROME: Status: ACTIVE | Noted: 2025-02-12

## 2025-02-12 PROBLEM — I10 BENIGN ESSENTIAL HYPERTENSION: Status: ACTIVE | Noted: 2025-02-12

## 2025-02-12 PROBLEM — Z86.39 HISTORY OF NUTRITIONAL DEFICIENCY: Status: ACTIVE | Noted: 2025-02-12

## 2025-02-12 PROBLEM — R06.09 DYSPNEA ON EXERTION: Status: ACTIVE | Noted: 2025-02-12

## 2025-02-17 ENCOUNTER — HOSPITAL ENCOUNTER (OUTPATIENT)
Dept: RADIOLOGY | Facility: CLINIC | Age: 75
Discharge: HOME | End: 2025-02-17
Payer: MEDICARE

## 2025-02-17 DIAGNOSIS — Z12.31 OTHER SCREENING MAMMOGRAM: ICD-10-CM

## 2025-02-17 PROCEDURE — 77067 SCR MAMMO BI INCL CAD: CPT | Performed by: RADIOLOGY

## 2025-02-17 PROCEDURE — 77063 BREAST TOMOSYNTHESIS BI: CPT | Performed by: RADIOLOGY

## 2025-02-17 PROCEDURE — 77067 SCR MAMMO BI INCL CAD: CPT

## 2025-02-18 LAB
25(OH)D3+25(OH)D2 SERPL-MCNC: 49 NG/ML (ref 30–100)
ALBUMIN SERPL-MCNC: 4.1 G/DL (ref 3.6–5.1)
ALP SERPL-CCNC: 72 U/L (ref 37–153)
ALT SERPL-CCNC: 21 U/L (ref 6–29)
ANION GAP SERPL CALCULATED.4IONS-SCNC: 13 MMOL/L (CALC) (ref 7–17)
AST SERPL-CCNC: 17 U/L (ref 10–35)
BASOPHILS # BLD AUTO: 41 CELLS/UL (ref 0–200)
BASOPHILS NFR BLD AUTO: 0.6 %
BILIRUB SERPL-MCNC: 1.2 MG/DL (ref 0.2–1.2)
BUN SERPL-MCNC: 20 MG/DL (ref 7–25)
CALCIUM SERPL-MCNC: 9 MG/DL (ref 8.6–10.4)
CHLORIDE SERPL-SCNC: 104 MMOL/L (ref 98–110)
CHOLEST SERPL-MCNC: 205 MG/DL
CHOLEST/HDLC SERPL: 4.8 (CALC)
CO2 SERPL-SCNC: 23 MMOL/L (ref 20–32)
CREAT SERPL-MCNC: 1 MG/DL (ref 0.6–1)
EGFRCR SERPLBLD CKD-EPI 2021: 60 ML/MIN/1.73M2
EOSINOPHIL # BLD AUTO: 131 CELLS/UL (ref 15–500)
EOSINOPHIL NFR BLD AUTO: 1.9 %
ERYTHROCYTE [DISTWIDTH] IN BLOOD BY AUTOMATED COUNT: 14.4 % (ref 11–15)
EST. AVERAGE GLUCOSE BLD GHB EST-MCNC: 117 MG/DL
EST. AVERAGE GLUCOSE BLD GHB EST-SCNC: 6.5 MMOL/L
GLUCOSE SERPL-MCNC: 105 MG/DL (ref 65–139)
HBA1C MFR BLD: 5.7 % OF TOTAL HGB
HCT VFR BLD AUTO: 46.3 % (ref 35–45)
HDLC SERPL-MCNC: 43 MG/DL
HGB BLD-MCNC: 15.1 G/DL (ref 11.7–15.5)
LDLC SERPL CALC-MCNC: ABNORMAL MG/DL
LYMPHOCYTES # BLD AUTO: 2926 CELLS/UL (ref 850–3900)
LYMPHOCYTES NFR BLD AUTO: 42.4 %
MCH RBC QN AUTO: 28.5 PG (ref 27–33)
MCHC RBC AUTO-ENTMCNC: 32.6 G/DL (ref 32–36)
MCV RBC AUTO: 87.4 FL (ref 80–100)
MONOCYTES # BLD AUTO: 649 CELLS/UL (ref 200–950)
MONOCYTES NFR BLD AUTO: 9.4 %
NEUTROPHILS # BLD AUTO: 3153 CELLS/UL (ref 1500–7800)
NEUTROPHILS NFR BLD AUTO: 45.7 %
NONHDLC SERPL-MCNC: 162 MG/DL (CALC)
PLATELET # BLD AUTO: 174 THOUSAND/UL (ref 140–400)
PMV BLD REES-ECKER: 11.1 FL (ref 7.5–12.5)
POTASSIUM SERPL-SCNC: 3.6 MMOL/L (ref 3.5–5.3)
PROT SERPL-MCNC: 6.4 G/DL (ref 6.1–8.1)
RBC # BLD AUTO: 5.3 MILLION/UL (ref 3.8–5.1)
SODIUM SERPL-SCNC: 140 MMOL/L (ref 135–146)
T4 FREE SERPL-MCNC: 1.1 NG/DL (ref 0.8–1.8)
TRIGL SERPL-MCNC: 488 MG/DL
TSH SERPL-ACNC: 1.34 MIU/L (ref 0.4–4.5)
VIT B12 SERPL-MCNC: 257 PG/ML (ref 200–1100)
WBC # BLD AUTO: 6.9 THOUSAND/UL (ref 3.8–10.8)

## 2025-03-04 PROBLEM — N18.31 CHRONIC KIDNEY DISEASE, STAGE 3A (MULTI): Chronic | Status: ACTIVE | Noted: 2025-01-28

## 2025-03-04 PROBLEM — Z86.39 HISTORY OF NUTRITIONAL DEFICIENCY: Status: RESOLVED | Noted: 2025-02-12 | Resolved: 2025-03-04

## 2025-03-04 PROBLEM — R03.0 BLOOD PRESSURE ELEVATED WITHOUT HISTORY OF HTN: Status: RESOLVED | Noted: 2023-03-29 | Resolved: 2025-03-04

## 2025-03-05 ENCOUNTER — OFFICE VISIT (OUTPATIENT)
Dept: CARDIOLOGY | Facility: CLINIC | Age: 75
End: 2025-03-05
Payer: MEDICARE

## 2025-03-05 VITALS
HEART RATE: 81 BPM | WEIGHT: 273 LBS | SYSTOLIC BLOOD PRESSURE: 134 MMHG | DIASTOLIC BLOOD PRESSURE: 82 MMHG | OXYGEN SATURATION: 94 % | BODY MASS INDEX: 48.36 KG/M2

## 2025-03-05 DIAGNOSIS — I48.0 PAROXYSMAL ATRIAL FIBRILLATION (MULTI): Primary | Chronic | ICD-10-CM

## 2025-03-05 DIAGNOSIS — E78.5 HYPERLIPIDEMIA, UNSPECIFIED HYPERLIPIDEMIA TYPE: Primary | ICD-10-CM

## 2025-03-05 DIAGNOSIS — I50.20 HFREF (HEART FAILURE WITH REDUCED EJECTION FRACTION): Chronic | ICD-10-CM

## 2025-03-05 DIAGNOSIS — E78.2 MIXED HYPERLIPIDEMIA: Chronic | ICD-10-CM

## 2025-03-05 DIAGNOSIS — I10 BENIGN ESSENTIAL HYPERTENSION: ICD-10-CM

## 2025-03-05 LAB
Q ONSET: 214 MS
QRS COUNT: 13 BEATS
QRS DURATION: 84 MS
QT INTERVAL: 372 MS
QTC CALCULATION(BAZETT): 426 MS
QTC FREDERICIA: 407 MS
R AXIS: -9 DEGREES
T AXIS: 30 DEGREES
T OFFSET: 400 MS
VENTRICULAR RATE: 79 BPM

## 2025-03-05 PROCEDURE — 99214 OFFICE O/P EST MOD 30 MIN: CPT | Performed by: INTERNAL MEDICINE

## 2025-03-05 PROCEDURE — 93005 ELECTROCARDIOGRAM TRACING: CPT | Performed by: INTERNAL MEDICINE

## 2025-03-05 PROCEDURE — 1160F RVW MEDS BY RX/DR IN RCRD: CPT | Performed by: INTERNAL MEDICINE

## 2025-03-05 PROCEDURE — 1036F TOBACCO NON-USER: CPT | Performed by: INTERNAL MEDICINE

## 2025-03-05 PROCEDURE — 3075F SYST BP GE 130 - 139MM HG: CPT | Performed by: INTERNAL MEDICINE

## 2025-03-05 PROCEDURE — 1123F ACP DISCUSS/DSCN MKR DOCD: CPT | Performed by: INTERNAL MEDICINE

## 2025-03-05 PROCEDURE — 3079F DIAST BP 80-89 MM HG: CPT | Performed by: INTERNAL MEDICINE

## 2025-03-05 PROCEDURE — 1159F MED LIST DOCD IN RCRD: CPT | Performed by: INTERNAL MEDICINE

## 2025-03-05 RX ORDER — ATORVASTATIN CALCIUM 80 MG/1
80 TABLET, FILM COATED ORAL DAILY
Qty: 90 TABLET | Refills: 1 | Status: SHIPPED | OUTPATIENT
Start: 2025-03-05 | End: 2025-06-03

## 2025-03-05 RX ORDER — AMOXICILLIN 250 MG
1 CAPSULE ORAL DAILY
COMMUNITY

## 2025-03-05 RX ORDER — FENOFIBRATE 145 MG/1
145 TABLET, FILM COATED ORAL DAILY
Qty: 30 TABLET | Refills: 11 | Status: SHIPPED | OUTPATIENT
Start: 2025-03-05 | End: 2026-03-05

## 2025-03-05 NOTE — PROGRESS NOTES
Referred by Candace VILLAFANA   I am seeing Dea for a 6-month follow-up.  Feeling well. No CPSOB/Palp.     Past Medical History:  Problem List Items Addressed This Visit    None     Past Medical History:   Diagnosis Date    Atrial fibrillation (Multi) 03/29/2023    New 11/2022. CHADS Vasc 4. On Eliiquis  CVN 12/9/22 transiently successfule. Loaded wtih amio repeat CVN but unable to maintain sinus. Seen by Dr. HERNANDEZ and ablation not felt to be the correct option    Body mass index (BMI) 50.0-59.9, adult (Multi) 04/22/2022    BMI 50.0-59.9, adult    Body mass index (BMI) 50.0-59.9, adult (Multi) 03/17/2022    BMI 50.0-59.9, adult    Essential (primary) hypertension     Benign essential hypertension    HFrEF (heart failure with reduced ejection fraction) 03/29/2023    EF 40-45% on echo of 10/31/2022    Hyperlipidemia 03/29/2023    CARI (obstructive sleep apnea) 03/29/2023    Other intervertebral disc displacement, lumbar region     Lumbar herniated disc    Personal history of other diseases of the digestive system     History of diverticulitis    Personal history of other diseases of the digestive system     History of constipation    Personal history of other endocrine, nutritional and metabolic disease     History of mixed hyperlipidemia    Personal history of other endocrine, nutritional and metabolic disease     History of vitamin D deficiency    Personal history of other specified conditions     History of vertigo      Past Surgical History:  She has a past surgical history that includes Other surgical history (12/16/2021); Other surgical history (12/16/2021); Other surgical history (12/16/2021); and Hysterectomy (1987).      Social History:  She reports that she has never smoked. She has never used smokeless tobacco. She reports that she does not currently use alcohol. She reports that she does not use drugs.    Family History:  Family History   Problem Relation Name Age of Onset    Colon cancer Mother      Rectal  cancer Mother      Cirrhosis Father      Lung cancer Father      Heart attack Father      Colon cancer Mother's Sister       Allergies:  Enviro stress    Outpatient Medications:  Current Outpatient Medications   Medication Instructions    acetaminophen (Tylenol 8 Hour) 650 mg ER tablet 1-2 tablet EVERY 12 HOURS (route: oral)    apixaban (ELIQUIS) 5 mg, oral, 2 times daily, Take 1 tab bid    atorvastatin (LIPITOR) 40 mg, oral, Daily    cetirizine (ZYRTEC) 10 mg, oral, Daily PRN    cholecalciferol (VITAMIN D-3) 125 mcg, oral, Daily    furosemide (LASIX) 40 mg, oral, Daily    levalbuterol (Xopenex) 45 mcg/actuation inhaler 2 puffs, inhalation, Every 4 hours PRN    lisinopril 10 mg, oral, Daily    metoprolol succinate XL (TOPROL-XL) 50 mg, oral, 2 times daily    nortriptyline (PAMELOR) 10 mg, oral, Nightly     Last Recorded Vitals:  There were no vitals filed for this visit.    Physical Exam  Patient is alert and oriented x3.  HEENT is unremarkable mucous members are moist  Neck no JVP no bruits upstrokes are full no thyromegaly  Lungs are clear bilaterally.  No wheezing crackles or rales  Heart regular rhythm normal S1-S2 there is no S3 no murmurs are heard.  Abdomen is soft bs are positive nontender nondistended no organomegaly no pulsatile masses  Extremities have no edema.  Distal pulses present palpable.  Neuro is grossly nonfocal  Skin has no rashes     Last Labs:  CBC -  Lab Results   Component Value Date    WBC 6.9 02/17/2025    HGB 15.1 02/17/2025    HCT 46.3 (H) 02/17/2025    MCV 87.4 02/17/2025     02/17/2025     CMP -  Lab Results   Component Value Date    CALCIUM 9.0 02/17/2025    PHOS 4.6 11/03/2022    PROT 6.4 02/17/2025    ALBUMIN 4.1 02/17/2025    AST 17 02/17/2025    ALT 21 02/17/2025    ALKPHOS 72 02/17/2025    BILITOT 1.2 02/17/2025     LIPID PANEL -   Lab Results   Component Value Date    CHOL 205 (H) 02/17/2025    HDL 43 (L) 02/17/2025    CHHDL 4.8 02/17/2025    VLDL 44 (H) 06/03/2024     TRIG 488 (H) 02/17/2025    NHDL 162 (H) 02/17/2025     RENAL FUNCTION PANEL -   Lab Results   Component Value Date    K 3.6 02/17/2025    PHOS 4.6 11/03/2022     Lab Results   Component Value Date     (H) 11/30/2022    HGBA1C 5.7 (H) 02/17/2025     Procedure    AST 8/21/23 NL, EF 61%    ECHO [10/31/2022]: EF 40-45%. Global hypok.        Assessment/Plan     1. Paroxysmal atrial fibrillation. Chads vascular score 4. Continue Eliquis.  Overall doing quite well since initiating CPAP.  To her knowledge no episodes of A-fib.    2. Respiratory failure.  She is now using oxygen only with her CPAP at night.  During the day she is not using any oxygen.     3. Cardiomyopathy. Likely nonischemic.  At the time of her A-fib her ejection fraction on echo was 40 to 45%.  Her last stress test August 2023 was regadenoson and revealed normal perfusion ejection fraction 61%    4. Hyperlipidemia.  Followed by Dr. Martin.  She is on atorvastatin 40 mg.  2/17/2025 HDL 43 triglycerides 488 LDL not measurable blood sugar 105.  Her triglycerides have once again gone back up.  I will start her on fenofibrate 145 mg daily.  Fasting blood work 3 months.  She will call 1 week after to discuss results    RTC 6 months.  EKG today.  Fasting blood work 3 months.    Guillermo Maria MD     Instructions and follow up

## 2025-03-05 NOTE — PATIENT INSTRUCTIONS
1. Paroxysmal atrial fibrillation. Chads vascular score 4. Continue Eliquis.  Overall doing quite well since initiating CPAP.  To her knowledge no episodes of A-fib.    2. Respiratory failure.  She is now using oxygen only with her CPAP at night.  During the day she is not using any oxygen.     3. Cardiomyopathy. Likely nonischemic.  At the time of her A-fib her ejection fraction on echo was 40 to 45%.  Her last stress test August 2023 was regadenoson and revealed normal perfusion ejection fraction 61%    4. Hyperlipidemia.  Followed by Dr. Martin.  She is on atorvastatin 40 mg.  2/17/2025 HDL 43 triglycerides 488 LDL not measurable blood sugar 105.  Her triglycerides have once again gone back up.  I will start her on fenofibrate 145 mg daily.  Fasting blood work 3 months.  She will call 1 week after to discuss results    RTC 6 months.  EKG today.  Fasting blood work 3 months.

## 2025-03-06 ENCOUNTER — TELEPHONE (OUTPATIENT)
Dept: PRIMARY CARE | Facility: CLINIC | Age: 75
End: 2025-03-06
Payer: MEDICARE

## 2025-03-06 NOTE — TELEPHONE ENCOUNTER
Saw that you increased her atorvastatin to 80mg, but her cardiologist started her on fenofibrate 124mg when he saw her trigylcerides  She is asking if she should take both medications

## 2025-03-27 ENCOUNTER — APPOINTMENT (OUTPATIENT)
Dept: OPHTHALMOLOGY | Facility: CLINIC | Age: 75
End: 2025-03-27
Payer: MEDICARE

## 2025-03-27 DIAGNOSIS — H25.813 COMBINED FORMS OF AGE-RELATED CATARACT OF BOTH EYES: Primary | ICD-10-CM

## 2025-03-27 ASSESSMENT — ENCOUNTER SYMPTOMS: EYES NEGATIVE: 1

## 2025-03-27 ASSESSMENT — REFRACTION_WEARINGRX
OD_AXIS: 044
SPECS_TYPE: PAL
OS_CYLINDER: -1.25
OS_AXIS: 088
OS_ADD: +2.25
OS_SPHERE: -3.50
OD_SPHERE: -4.50
OD_ADD: +2.25
OD_CYLINDER: -1.25

## 2025-03-27 ASSESSMENT — VISUAL ACUITY
OS_CC: 20/30+2
OD_BAT_MED: 20/50
OS_BAT_MED: 20/60
METHOD: SNELLEN - LINEAR
OD_CC: 20/30+2

## 2025-03-27 ASSESSMENT — REFRACTION_MANIFEST
OD_AXIS: 035
OS_ADD: +2.75
OS_SPHERE: -3.25
OS_CYLINDER: -1.75
OD_CYLINDER: -0.50
OD_SPHERE: -4.00
OS_AXIS: 110
OD_ADD: +2.75

## 2025-03-27 ASSESSMENT — TONOMETRY
OD_IOP_MMHG: 23
OS_IOP_MMHG: 23
IOP_METHOD: GOLDMANN APPLANATION

## 2025-03-27 ASSESSMENT — CONF VISUAL FIELD
OD_INFERIOR_TEMPORAL_RESTRICTION: 0
OS_INFERIOR_NASAL_RESTRICTION: 0
OD_SUPERIOR_NASAL_RESTRICTION: 0
OD_INFERIOR_NASAL_RESTRICTION: 0
OS_INFERIOR_TEMPORAL_RESTRICTION: 0
OS_NORMAL: 1
OS_SUPERIOR_TEMPORAL_RESTRICTION: 0
OS_SUPERIOR_NASAL_RESTRICTION: 0
OD_NORMAL: 1
OD_SUPERIOR_TEMPORAL_RESTRICTION: 0

## 2025-03-27 ASSESSMENT — CUP TO DISC RATIO
OS_RATIO: 0.3
OD_RATIO: 0.3

## 2025-03-27 ASSESSMENT — SLIT LAMP EXAM - LIDS
COMMENTS: NORMAL
COMMENTS: NORMAL

## 2025-03-27 ASSESSMENT — EXTERNAL EXAM - RIGHT EYE: OD_EXAM: NORMAL

## 2025-03-27 ASSESSMENT — EXTERNAL EXAM - LEFT EYE: OS_EXAM: NORMAL

## 2025-03-28 ASSESSMENT — PACHYMETRY
EXAM_DATE: 3/28/2025
OS_CT(UM): 650
OD_CT(UM): 650

## 2025-03-28 NOTE — PROGRESS NOTES
Assessment/Plan   Diagnoses and all orders for this visit:  Combined forms of age-related cataract of both eyes  Borderline visually significant, patient is tolerating.     Observe cataract miley now.    See in 4-6 months.

## 2025-03-30 ENCOUNTER — APPOINTMENT (OUTPATIENT)
Dept: RADIOLOGY | Facility: HOSPITAL | Age: 75
End: 2025-03-30
Payer: MEDICARE

## 2025-03-30 ENCOUNTER — APPOINTMENT (OUTPATIENT)
Dept: CARDIOLOGY | Facility: HOSPITAL | Age: 75
End: 2025-03-30
Payer: MEDICARE

## 2025-03-30 ENCOUNTER — HOSPITAL ENCOUNTER (OUTPATIENT)
Facility: HOSPITAL | Age: 75
Setting detail: OBSERVATION
Discharge: HOME | End: 2025-03-31
Attending: GENERAL PRACTICE | Admitting: STUDENT IN AN ORGANIZED HEALTH CARE EDUCATION/TRAINING PROGRAM
Payer: MEDICARE

## 2025-03-30 DIAGNOSIS — R42 DIZZINESS: Primary | ICD-10-CM

## 2025-03-30 LAB
ALBUMIN SERPL BCP-MCNC: 3.9 G/DL (ref 3.4–5)
ALP SERPL-CCNC: 50 U/L (ref 33–136)
ALT SERPL W P-5'-P-CCNC: 15 U/L (ref 7–45)
ANION GAP SERPL CALC-SCNC: 15 MMOL/L (ref 10–20)
APPEARANCE UR: CLEAR
AST SERPL W P-5'-P-CCNC: 14 U/L (ref 9–39)
BASOPHILS # BLD AUTO: 0.03 X10*3/UL (ref 0–0.1)
BASOPHILS NFR BLD AUTO: 0.4 %
BILIRUB SERPL-MCNC: 0.6 MG/DL (ref 0–1.2)
BILIRUB UR STRIP.AUTO-MCNC: NEGATIVE MG/DL
BNP SERPL-MCNC: 191 PG/ML (ref 0–99)
BUN SERPL-MCNC: 21 MG/DL (ref 6–23)
CALCIUM SERPL-MCNC: 9.1 MG/DL (ref 8.6–10.3)
CARDIAC TROPONIN I PNL SERPL HS: 3 NG/L (ref 0–13)
CHLORIDE SERPL-SCNC: 105 MMOL/L (ref 98–107)
CO2 SERPL-SCNC: 23 MMOL/L (ref 21–32)
COLOR UR: NORMAL
CREAT SERPL-MCNC: 1.1 MG/DL (ref 0.5–1.05)
EGFRCR SERPLBLD CKD-EPI 2021: 53 ML/MIN/1.73M*2
EOSINOPHIL # BLD AUTO: 0.1 X10*3/UL (ref 0–0.4)
EOSINOPHIL NFR BLD AUTO: 1.2 %
ERYTHROCYTE [DISTWIDTH] IN BLOOD BY AUTOMATED COUNT: 14.5 % (ref 11.5–14.5)
FLUAV RNA RESP QL NAA+PROBE: NOT DETECTED
FLUBV RNA RESP QL NAA+PROBE: NOT DETECTED
GLUCOSE SERPL-MCNC: 136 MG/DL (ref 74–99)
GLUCOSE UR STRIP.AUTO-MCNC: NORMAL MG/DL
HCT VFR BLD AUTO: 44.3 % (ref 36–46)
HGB BLD-MCNC: 14.1 G/DL (ref 12–16)
IMM GRANULOCYTES # BLD AUTO: 0.03 X10*3/UL (ref 0–0.5)
IMM GRANULOCYTES NFR BLD AUTO: 0.4 % (ref 0–0.9)
KETONES UR STRIP.AUTO-MCNC: NEGATIVE MG/DL
LEUKOCYTE ESTERASE UR QL STRIP.AUTO: NEGATIVE
LYMPHOCYTES # BLD AUTO: 3.39 X10*3/UL (ref 0.8–3)
LYMPHOCYTES NFR BLD AUTO: 41.4 %
MAGNESIUM SERPL-MCNC: 1.88 MG/DL (ref 1.6–2.4)
MCH RBC QN AUTO: 27.7 PG (ref 26–34)
MCHC RBC AUTO-ENTMCNC: 31.8 G/DL (ref 32–36)
MCV RBC AUTO: 87 FL (ref 80–100)
MONOCYTES # BLD AUTO: 0.64 X10*3/UL (ref 0.05–0.8)
MONOCYTES NFR BLD AUTO: 7.8 %
NEUTROPHILS # BLD AUTO: 4 X10*3/UL (ref 1.6–5.5)
NEUTROPHILS NFR BLD AUTO: 48.8 %
NITRITE UR QL STRIP.AUTO: NEGATIVE
NRBC BLD-RTO: 0 /100 WBCS (ref 0–0)
PH UR STRIP.AUTO: 5.5 [PH]
PLATELET # BLD AUTO: 207 X10*3/UL (ref 150–450)
POTASSIUM SERPL-SCNC: 3.6 MMOL/L (ref 3.5–5.3)
PROT SERPL-MCNC: 6.3 G/DL (ref 6.4–8.2)
PROT UR STRIP.AUTO-MCNC: NEGATIVE MG/DL
RBC # BLD AUTO: 5.09 X10*6/UL (ref 4–5.2)
RBC # UR STRIP.AUTO: NEGATIVE MG/DL
SARS-COV-2 RNA RESP QL NAA+PROBE: NOT DETECTED
SODIUM SERPL-SCNC: 139 MMOL/L (ref 136–145)
SP GR UR STRIP.AUTO: 1.01
UROBILINOGEN UR STRIP.AUTO-MCNC: NORMAL MG/DL
WBC # BLD AUTO: 8.2 X10*3/UL (ref 4.4–11.3)

## 2025-03-30 PROCEDURE — 84484 ASSAY OF TROPONIN QUANT: CPT | Performed by: PHYSICIAN ASSISTANT

## 2025-03-30 PROCEDURE — 93005 ELECTROCARDIOGRAM TRACING: CPT

## 2025-03-30 PROCEDURE — 70450 CT HEAD/BRAIN W/O DYE: CPT | Performed by: RADIOLOGY

## 2025-03-30 PROCEDURE — 80053 COMPREHEN METABOLIC PANEL: CPT | Performed by: PHYSICIAN ASSISTANT

## 2025-03-30 PROCEDURE — 87636 SARSCOV2 & INF A&B AMP PRB: CPT | Performed by: PHYSICIAN ASSISTANT

## 2025-03-30 PROCEDURE — 99285 EMERGENCY DEPT VISIT HI MDM: CPT | Mod: 25 | Performed by: GENERAL PRACTICE

## 2025-03-30 PROCEDURE — 85025 COMPLETE CBC W/AUTO DIFF WBC: CPT | Performed by: PHYSICIAN ASSISTANT

## 2025-03-30 PROCEDURE — 94660 CPAP INITIATION&MGMT: CPT

## 2025-03-30 PROCEDURE — 36415 COLL VENOUS BLD VENIPUNCTURE: CPT | Performed by: PHYSICIAN ASSISTANT

## 2025-03-30 PROCEDURE — 81003 URINALYSIS AUTO W/O SCOPE: CPT | Performed by: PHYSICIAN ASSISTANT

## 2025-03-30 PROCEDURE — 83735 ASSAY OF MAGNESIUM: CPT | Performed by: PHYSICIAN ASSISTANT

## 2025-03-30 PROCEDURE — G0378 HOSPITAL OBSERVATION PER HR: HCPCS

## 2025-03-30 PROCEDURE — 83880 ASSAY OF NATRIURETIC PEPTIDE: CPT | Performed by: PHYSICIAN ASSISTANT

## 2025-03-30 PROCEDURE — 99222 1ST HOSP IP/OBS MODERATE 55: CPT

## 2025-03-30 PROCEDURE — 96372 THER/PROPH/DIAG INJ SC/IM: CPT

## 2025-03-30 PROCEDURE — 2500000002 HC RX 250 W HCPCS SELF ADMINISTERED DRUGS (ALT 637 FOR MEDICARE OP, ALT 636 FOR OP/ED): Mod: MUE

## 2025-03-30 PROCEDURE — 2500000002 HC RX 250 W HCPCS SELF ADMINISTERED DRUGS (ALT 637 FOR MEDICARE OP, ALT 636 FOR OP/ED): Performed by: PHYSICIAN ASSISTANT

## 2025-03-30 PROCEDURE — 70450 CT HEAD/BRAIN W/O DYE: CPT

## 2025-03-30 PROCEDURE — 71045 X-RAY EXAM CHEST 1 VIEW: CPT

## 2025-03-30 PROCEDURE — 2500000004 HC RX 250 GENERAL PHARMACY W/ HCPCS (ALT 636 FOR OP/ED)

## 2025-03-30 PROCEDURE — 2500000001 HC RX 250 WO HCPCS SELF ADMINISTERED DRUGS (ALT 637 FOR MEDICARE OP)

## 2025-03-30 PROCEDURE — 71045 X-RAY EXAM CHEST 1 VIEW: CPT | Performed by: RADIOLOGY

## 2025-03-30 RX ORDER — CETIRIZINE HYDROCHLORIDE 10 MG/1
10 TABLET ORAL DAILY PRN
Status: DISCONTINUED | OUTPATIENT
Start: 2025-03-30 | End: 2025-03-31 | Stop reason: HOSPADM

## 2025-03-30 RX ORDER — METOPROLOL SUCCINATE 50 MG/1
50 TABLET, EXTENDED RELEASE ORAL 2 TIMES DAILY
Status: DISCONTINUED | OUTPATIENT
Start: 2025-03-30 | End: 2025-03-31 | Stop reason: HOSPADM

## 2025-03-30 RX ORDER — ONDANSETRON HYDROCHLORIDE 2 MG/ML
4 INJECTION, SOLUTION INTRAVENOUS ONCE
Status: DISCONTINUED | OUTPATIENT
Start: 2025-03-30 | End: 2025-03-31 | Stop reason: HOSPADM

## 2025-03-30 RX ORDER — FENOFIBRATE 145 MG/1
145 TABLET, FILM COATED ORAL NIGHTLY
Status: DISCONTINUED | OUTPATIENT
Start: 2025-03-30 | End: 2025-03-30 | Stop reason: RX

## 2025-03-30 RX ORDER — PROMETHAZINE HYDROCHLORIDE 25 MG/ML
12.5 INJECTION, SOLUTION INTRAMUSCULAR; INTRAVENOUS EVERY 6 HOURS PRN
Status: DISCONTINUED | OUTPATIENT
Start: 2025-03-30 | End: 2025-03-31 | Stop reason: HOSPADM

## 2025-03-30 RX ORDER — ACETAMINOPHEN 160 MG/5ML
650 SOLUTION ORAL EVERY 4 HOURS PRN
Status: DISCONTINUED | OUTPATIENT
Start: 2025-03-30 | End: 2025-03-31 | Stop reason: HOSPADM

## 2025-03-30 RX ORDER — FENOFIBRATE 160 MG/1
160 TABLET ORAL NIGHTLY
Status: DISCONTINUED | OUTPATIENT
Start: 2025-03-30 | End: 2025-03-31 | Stop reason: HOSPADM

## 2025-03-30 RX ORDER — ACETAMINOPHEN 650 MG/1
650 SUPPOSITORY RECTAL EVERY 4 HOURS PRN
Status: DISCONTINUED | OUTPATIENT
Start: 2025-03-30 | End: 2025-03-31 | Stop reason: HOSPADM

## 2025-03-30 RX ORDER — NORTRIPTYLINE HYDROCHLORIDE 10 MG/1
10 CAPSULE ORAL NIGHTLY
Status: DISCONTINUED | OUTPATIENT
Start: 2025-03-30 | End: 2025-03-31 | Stop reason: HOSPADM

## 2025-03-30 RX ORDER — LISINOPRIL 10 MG/1
10 TABLET ORAL EVERY MORNING
Status: DISCONTINUED | OUTPATIENT
Start: 2025-03-31 | End: 2025-03-31 | Stop reason: HOSPADM

## 2025-03-30 RX ORDER — ALBUTEROL SULFATE 90 UG/1
2 INHALANT RESPIRATORY (INHALATION) EVERY 4 HOURS PRN
Status: DISCONTINUED | OUTPATIENT
Start: 2025-03-30 | End: 2025-03-31 | Stop reason: HOSPADM

## 2025-03-30 RX ORDER — MECLIZINE HYDROCHLORIDE 25 MG/1
25 TABLET ORAL ONCE
Status: COMPLETED | OUTPATIENT
Start: 2025-03-30 | End: 2025-03-30

## 2025-03-30 RX ORDER — MECLIZINE HYDROCHLORIDE 25 MG/1
25 TABLET ORAL EVERY 12 HOURS PRN
Status: DISCONTINUED | OUTPATIENT
Start: 2025-03-30 | End: 2025-03-31 | Stop reason: HOSPADM

## 2025-03-30 RX ORDER — ACETAMINOPHEN 325 MG/1
650 TABLET ORAL EVERY 4 HOURS PRN
Status: DISCONTINUED | OUTPATIENT
Start: 2025-03-30 | End: 2025-03-31 | Stop reason: HOSPADM

## 2025-03-30 RX ORDER — FUROSEMIDE 40 MG/1
40 TABLET ORAL EVERY MORNING
Status: DISCONTINUED | OUTPATIENT
Start: 2025-03-31 | End: 2025-03-31 | Stop reason: HOSPADM

## 2025-03-30 RX ORDER — AMOXICILLIN 250 MG
1 CAPSULE ORAL DAILY PRN
Status: DISCONTINUED | OUTPATIENT
Start: 2025-03-30 | End: 2025-03-31 | Stop reason: HOSPADM

## 2025-03-30 RX ORDER — ATORVASTATIN CALCIUM 80 MG/1
80 TABLET, FILM COATED ORAL EVERY MORNING
Status: DISCONTINUED | OUTPATIENT
Start: 2025-03-31 | End: 2025-03-31 | Stop reason: HOSPADM

## 2025-03-30 RX ADMIN — PSYLLIUM HUSK 1 PACKET: 3.4 POWDER ORAL at 18:10

## 2025-03-30 RX ADMIN — APIXABAN 5 MG: 5 TABLET, FILM COATED ORAL at 20:08

## 2025-03-30 RX ADMIN — MECLIZINE HYDROCHLORIDE 25 MG: 25 TABLET ORAL at 10:41

## 2025-03-30 RX ADMIN — METOPROLOL SUCCINATE 50 MG: 50 TABLET, EXTENDED RELEASE ORAL at 20:08

## 2025-03-30 RX ADMIN — NORTRIPTYLINE HYDROCHLORIDE 10 MG: 10 CAPSULE ORAL at 20:08

## 2025-03-30 RX ADMIN — PROMETHAZINE HYDROCHLORIDE 12.5 MG: 25 INJECTION INTRAMUSCULAR; INTRAVENOUS at 15:42

## 2025-03-30 RX ADMIN — FENOFIBRATE 160 MG: 160 TABLET ORAL at 20:08

## 2025-03-30 SDOH — ECONOMIC STABILITY: INCOME INSECURITY: IN THE PAST 12 MONTHS HAS THE ELECTRIC, GAS, OIL, OR WATER COMPANY THREATENED TO SHUT OFF SERVICES IN YOUR HOME?: NO

## 2025-03-30 SDOH — ECONOMIC STABILITY: TRANSPORTATION INSECURITY: IN THE PAST 12 MONTHS, HAS LACK OF TRANSPORTATION KEPT YOU FROM MEDICAL APPOINTMENTS OR FROM GETTING MEDICATIONS?: NO

## 2025-03-30 SDOH — SOCIAL STABILITY: SOCIAL INSECURITY: DO YOU FEEL ANYONE HAS EXPLOITED OR TAKEN ADVANTAGE OF YOU FINANCIALLY OR OF YOUR PERSONAL PROPERTY?: NO

## 2025-03-30 SDOH — HEALTH STABILITY: MENTAL HEALTH: HOW OFTEN DO YOU HAVE A DRINK CONTAINING ALCOHOL?: MONTHLY OR LESS

## 2025-03-30 SDOH — SOCIAL STABILITY: SOCIAL INSECURITY: WITHIN THE LAST YEAR, HAVE YOU BEEN HUMILIATED OR EMOTIONALLY ABUSED IN OTHER WAYS BY YOUR PARTNER OR EX-PARTNER?: NO

## 2025-03-30 SDOH — SOCIAL STABILITY: SOCIAL INSECURITY: WITHIN THE LAST YEAR, HAVE YOU BEEN AFRAID OF YOUR PARTNER OR EX-PARTNER?: NO

## 2025-03-30 SDOH — HEALTH STABILITY: MENTAL HEALTH: HOW OFTEN DO YOU HAVE SIX OR MORE DRINKS ON ONE OCCASION?: NEVER

## 2025-03-30 SDOH — SOCIAL STABILITY: SOCIAL INSECURITY
WITHIN THE LAST YEAR, HAVE YOU BEEN RAPED OR FORCED TO HAVE ANY KIND OF SEXUAL ACTIVITY BY YOUR PARTNER OR EX-PARTNER?: NO

## 2025-03-30 SDOH — SOCIAL STABILITY: SOCIAL INSECURITY: DOES ANYONE TRY TO KEEP YOU FROM HAVING/CONTACTING OTHER FRIENDS OR DOING THINGS OUTSIDE YOUR HOME?: NO

## 2025-03-30 SDOH — ECONOMIC STABILITY: FOOD INSECURITY: WITHIN THE PAST 12 MONTHS, YOU WORRIED THAT YOUR FOOD WOULD RUN OUT BEFORE YOU GOT THE MONEY TO BUY MORE.: NEVER TRUE

## 2025-03-30 SDOH — SOCIAL STABILITY: SOCIAL INSECURITY: HAS ANYONE EVER THREATENED TO HURT YOUR FAMILY OR YOUR PETS?: NO

## 2025-03-30 SDOH — SOCIAL STABILITY: SOCIAL INSECURITY: ABUSE: ADULT

## 2025-03-30 SDOH — ECONOMIC STABILITY: HOUSING INSECURITY: AT ANY TIME IN THE PAST 12 MONTHS, WERE YOU HOMELESS OR LIVING IN A SHELTER (INCLUDING NOW)?: NO

## 2025-03-30 SDOH — ECONOMIC STABILITY: HOUSING INSECURITY: IN THE LAST 12 MONTHS, WAS THERE A TIME WHEN YOU WERE NOT ABLE TO PAY THE MORTGAGE OR RENT ON TIME?: NO

## 2025-03-30 SDOH — SOCIAL STABILITY: SOCIAL INSECURITY
WITHIN THE LAST YEAR, HAVE YOU BEEN KICKED, HIT, SLAPPED, OR OTHERWISE PHYSICALLY HURT BY YOUR PARTNER OR EX-PARTNER?: NO

## 2025-03-30 SDOH — ECONOMIC STABILITY: FOOD INSECURITY: HOW HARD IS IT FOR YOU TO PAY FOR THE VERY BASICS LIKE FOOD, HOUSING, MEDICAL CARE, AND HEATING?: SOMEWHAT HARD

## 2025-03-30 SDOH — SOCIAL STABILITY: SOCIAL INSECURITY: DO YOU FEEL UNSAFE GOING BACK TO THE PLACE WHERE YOU ARE LIVING?: NO

## 2025-03-30 SDOH — ECONOMIC STABILITY: FOOD INSECURITY: WITHIN THE PAST 12 MONTHS, THE FOOD YOU BOUGHT JUST DIDN'T LAST AND YOU DIDN'T HAVE MONEY TO GET MORE.: NEVER TRUE

## 2025-03-30 SDOH — SOCIAL STABILITY: SOCIAL INSECURITY: HAVE YOU HAD THOUGHTS OF HARMING ANYONE ELSE?: NO

## 2025-03-30 SDOH — SOCIAL STABILITY: SOCIAL INSECURITY: ARE YOU OR HAVE YOU BEEN THREATENED OR ABUSED PHYSICALLY, EMOTIONALLY, OR SEXUALLY BY ANYONE?: NO

## 2025-03-30 SDOH — SOCIAL STABILITY: SOCIAL INSECURITY: WERE YOU ABLE TO COMPLETE ALL THE BEHAVIORAL HEALTH SCREENINGS?: YES

## 2025-03-30 SDOH — HEALTH STABILITY: MENTAL HEALTH: HOW MANY DRINKS CONTAINING ALCOHOL DO YOU HAVE ON A TYPICAL DAY WHEN YOU ARE DRINKING?: PATIENT DOES NOT DRINK

## 2025-03-30 ASSESSMENT — COGNITIVE AND FUNCTIONAL STATUS - GENERAL
TURNING FROM BACK TO SIDE WHILE IN FLAT BAD: A LITTLE
WALKING IN HOSPITAL ROOM: A LITTLE
PERSONAL GROOMING: A LITTLE
WALKING IN HOSPITAL ROOM: A LOT
MOVING TO AND FROM BED TO CHAIR: A LITTLE
MOVING FROM LYING ON BACK TO SITTING ON SIDE OF FLAT BED WITH BEDRAILS: A LITTLE
TURNING FROM BACK TO SIDE WHILE IN FLAT BAD: A LITTLE
DAILY ACTIVITIY SCORE: 22
MOBILITY SCORE: 16
MOBILITY SCORE: 18
TOILETING: A LITTLE
MOVING TO AND FROM BED TO CHAIR: A LITTLE
PATIENT BASELINE BEDBOUND: NO
CLIMB 3 TO 5 STEPS WITH RAILING: A LITTLE
CLIMB 3 TO 5 STEPS WITH RAILING: A LOT
STANDING UP FROM CHAIR USING ARMS: A LITTLE
STANDING UP FROM CHAIR USING ARMS: A LITTLE
DAILY ACTIVITIY SCORE: 23
TOILETING: A LITTLE
MOVING FROM LYING ON BACK TO SITTING ON SIDE OF FLAT BED WITH BEDRAILS: A LITTLE

## 2025-03-30 ASSESSMENT — ACTIVITIES OF DAILY LIVING (ADL)
BATHING: INDEPENDENT
WALKS IN HOME: INDEPENDENT
FEEDING YOURSELF: INDEPENDENT
DRESSING YOURSELF: INDEPENDENT
ADEQUATE_TO_COMPLETE_ADL: YES
GROOMING: INDEPENDENT
LACK_OF_TRANSPORTATION: NO
BATHING: INDEPENDENT
DRESSING YOURSELF: INDEPENDENT
PATIENT'S MEMORY ADEQUATE TO SAFELY COMPLETE DAILY ACTIVITIES?: YES
TOILETING: INDEPENDENT
HEARING - RIGHT EAR: FUNCTIONAL
WALKS IN HOME: INDEPENDENT
JUDGMENT_ADEQUATE_SAFELY_COMPLETE_DAILY_ACTIVITIES: YES
HEARING - LEFT EAR: FUNCTIONAL
HEARING - RIGHT EAR: FUNCTIONAL
HEARING - LEFT EAR: FUNCTIONAL
JUDGMENT_ADEQUATE_SAFELY_COMPLETE_DAILY_ACTIVITIES: YES
FEEDING YOURSELF: INDEPENDENT
PATIENT'S MEMORY ADEQUATE TO SAFELY COMPLETE DAILY ACTIVITIES?: YES
TOILETING: INDEPENDENT
GROOMING: INDEPENDENT
LACK_OF_TRANSPORTATION: NO
ADEQUATE_TO_COMPLETE_ADL: YES
LACK_OF_TRANSPORTATION: NO

## 2025-03-30 ASSESSMENT — LIFESTYLE VARIABLES
AUDIT-C TOTAL SCORE: 1
HOW MANY STANDARD DRINKS CONTAINING ALCOHOL DO YOU HAVE ON A TYPICAL DAY: PATIENT DOES NOT DRINK
HOW OFTEN DO YOU HAVE 6 OR MORE DRINKS ON ONE OCCASION: NEVER
HOW OFTEN DO YOU HAVE A DRINK CONTAINING ALCOHOL: MONTHLY OR LESS
AUDIT-C TOTAL SCORE: 1
AUDIT-C TOTAL SCORE: 1
SKIP TO QUESTIONS 9-10: 1
SKIP TO QUESTIONS 9-10: 1

## 2025-03-30 ASSESSMENT — PATIENT HEALTH QUESTIONNAIRE - PHQ9
2. FEELING DOWN, DEPRESSED OR HOPELESS: NOT AT ALL
SUM OF ALL RESPONSES TO PHQ9 QUESTIONS 1 & 2: 0
1. LITTLE INTEREST OR PLEASURE IN DOING THINGS: NOT AT ALL

## 2025-03-30 ASSESSMENT — COLUMBIA-SUICIDE SEVERITY RATING SCALE - C-SSRS
2. HAVE YOU ACTUALLY HAD ANY THOUGHTS OF KILLING YOURSELF?: NO
1. IN THE PAST MONTH, HAVE YOU WISHED YOU WERE DEAD OR WISHED YOU COULD GO TO SLEEP AND NOT WAKE UP?: NO
6. HAVE YOU EVER DONE ANYTHING, STARTED TO DO ANYTHING, OR PREPARED TO DO ANYTHING TO END YOUR LIFE?: NO

## 2025-03-30 ASSESSMENT — PAIN SCALES - GENERAL
PAINLEVEL_OUTOF10: 0 - NO PAIN
PAINLEVEL_OUTOF10: 0 - NO PAIN

## 2025-03-30 ASSESSMENT — PAIN - FUNCTIONAL ASSESSMENT: PAIN_FUNCTIONAL_ASSESSMENT: 0-10

## 2025-03-30 NOTE — ED TRIAGE NOTES
Patient arrives vie EMS from home with complaints of dizziness since awakening. States worse when she moves around feels similar to episode of vertigo, endorses nausea. No other distress noted

## 2025-03-30 NOTE — ED PROVIDER NOTES
Limitations to History: none  External Records Reviewed  Independent Historians: self  Social determinants affecting care: none    HPI  Dea Duenas is a 74 y.o. female with significant past medical history of atrial fibrillation on Eliquis, hypertension, hyperlipidemia, congestive heart failure, chronic kidney disease, presents emergency department for assessment of dizziness this morning.  She reports that she woke up this morning and her stomach felt off.  She reports that she was sitting in a rocking chair and she started feeling dizzy.  She describes this as room spinning around.  She reports that when she closes her eyes and rest her head dizziness stops.  When she moves her head or sits up, she notices the dizziness returns.  She reports that she was brought in by EMS and when they moved her from the stretcher to the bed she did become nauseated.  She denies any upper or lower extremity numbness, tingling, weakness.  Denies recent head injury or trauma.  Denies headache.  She denies blurred vision, double vision, vision loss.  She denies any fever or chills.  She has not had cough, congestion, sore throat.  She denies chest pains or shortness of breath.  She has not had diarrhea or changes to urination.  She has a history of vertigo and this does feel similar.  She reports that she has been compliant with her medications.  She has no further complaints.    PMH  Past Medical History:   Diagnosis Date    Atrial fibrillation (Multi) 03/29/2023    New 11/2022. CHADS Vasc 4. On Eliiquis  CVN 12/9/22 transiently successfule. Loaded wtih amio repeat CVN but unable to maintain sinus. Seen by Dr. HERNANDEZ and ablation not felt to be the correct option    Body mass index (BMI) 50.0-59.9, adult (Multi) 04/22/2022    BMI 50.0-59.9, adult    Body mass index (BMI) 50.0-59.9, adult (Multi) 03/17/2022    BMI 50.0-59.9, adult    Cataract     Chronic kidney disease, stage 3a (Multi) 01/28/2025    Essential (primary)  hypertension     Benign essential hypertension    HFrEF (heart failure with reduced ejection fraction) 03/29/2023    EF 40-45% on echo of 10/31/2022    Hyperlipidemia 03/29/2023    CARI (obstructive sleep apnea) 03/29/2023    Other intervertebral disc displacement, lumbar region     Lumbar herniated disc    Personal history of other diseases of the digestive system     History of diverticulitis    Personal history of other diseases of the digestive system     History of constipation    Personal history of other endocrine, nutritional and metabolic disease     History of mixed hyperlipidemia    Personal history of other endocrine, nutritional and metabolic disease     History of vitamin D deficiency    Personal history of other specified conditions     History of vertigo    reviewed by myself.    Meds  Current Outpatient Medications   Medication Instructions    acetaminophen (Tylenol 8 Hour) 650 mg ER tablet Take 1-2 tablets (650-1,300 mg) by mouth every 8 hours if needed for mild pain (1 - 3) or headaches.    apixaban (ELIQUIS) 5 mg, oral, 2 times daily, Take 1 tab bid    atorvastatin (LIPITOR) 80 mg, oral, Daily    cetirizine (ZYRTEC) 10 mg, oral, Daily PRN    cholecalciferol (VITAMIN D-3) 125 mcg, oral, Daily    fenofibrate (TRICOR) 145 mg, oral, Daily    furosemide (LASIX) 40 mg, oral, Daily    levalbuterol (Xopenex) 45 mcg/actuation inhaler 2 puffs, inhalation, Every 4 hours PRN    lisinopril 10 mg, oral, Daily    metoprolol succinate XL (TOPROL-XL) 50 mg, oral, 2 times daily    nortriptyline (PAMELOR) 10 mg, oral, Nightly    sennosides-docusate sodium (Landy-Colace) 8.6-50 mg tablet 1 tablet, Daily PRN       Allergies  Allergies   Allergen Reactions    Enviro Stress Runny nose    reviewed by myself.    SHx  Social History     Tobacco Use    Smoking status: Never    Smokeless tobacco: Never   Substance Use Topics    Alcohol use: Not Currently    Drug use: Never    reviewed by  myself.      ------------------------------------------------------------------------------------------------------------------------------------------    /66   Pulse 70   Temp 36 °C (96.8 °F) (Temporal)   Resp (!) 21   Wt 122 kg (270 lb)   SpO2 94%   BMI 47.83 kg/m²     Physical Exam  Constitutional:       General: She is not in acute distress.     Appearance: Normal appearance. She is not ill-appearing or toxic-appearing.   HENT:      Head: Normocephalic.      Nose: Nose normal.      Mouth/Throat:      Mouth: Mucous membranes are moist.   Eyes:      Extraocular Movements: Extraocular movements intact.      Conjunctiva/sclera: Conjunctivae normal.      Pupils: Pupils are equal, round, and reactive to light.   Cardiovascular:      Rate and Rhythm: Normal rate. Rhythm irregularly irregular.   Pulmonary:      Effort: Pulmonary effort is normal.      Breath sounds: Normal breath sounds.   Abdominal:      General: Abdomen is flat.      Palpations: Abdomen is soft.      Tenderness: There is no abdominal tenderness.   Musculoskeletal:         General: Normal range of motion.      Cervical back: Neck supple.   Skin:     General: Skin is warm and dry.   Neurological:      General: No focal deficit present.      Mental Status: She is alert and oriented to person, place, and time.      Cranial Nerves: Cranial nerves 2-12 are intact.      Sensory: Sensation is intact.      Motor: Motor function is intact.      Coordination: Coordination is intact.      Comments: NIH 0   Psychiatric:         Attention and Perception: Attention normal.         Mood and Affect: Mood normal.          ------------------------------------------------------------------------------------------------------------------------------------------  Labs  Labs Reviewed   CBC WITH AUTO DIFFERENTIAL - Abnormal       Result Value    WBC 8.2      nRBC 0.0      RBC 5.09      Hemoglobin 14.1      Hematocrit 44.3      MCV 87      MCH 27.7      MCHC 31.8  (*)     RDW 14.5      Platelets 207      Neutrophils % 48.8      Immature Granulocytes %, Automated 0.4      Lymphocytes % 41.4      Monocytes % 7.8      Eosinophils % 1.2      Basophils % 0.4      Neutrophils Absolute 4.00      Immature Granulocytes Absolute, Automated 0.03      Lymphocytes Absolute 3.39 (*)     Monocytes Absolute 0.64      Eosinophils Absolute 0.10      Basophils Absolute 0.03     COMPREHENSIVE METABOLIC PANEL - Abnormal    Glucose 136 (*)     Sodium 139      Potassium 3.6      Chloride 105      Bicarbonate 23      Anion Gap 15      Urea Nitrogen 21      Creatinine 1.10 (*)     eGFR 53 (*)     Calcium 9.1      Albumin 3.9      Alkaline Phosphatase 50      Total Protein 6.3 (*)     AST 14      Bilirubin, Total 0.6      ALT 15     B-TYPE NATRIURETIC PEPTIDE - Abnormal     (*)     Narrative:        <100 pg/mL - Heart failure unlikely  100-299 pg/mL - Intermediate probability of acute heart                  failure exacerbation. Correlate with clinical                  context and patient history.    >=300 pg/mL - Heart Failure likely. Correlate with clinical                  context and patient history.    BNP testing is performed using different testing methodology at [facility] than at other [facility] hospitals. Direct result comparisons should only be made within the same method.      MAGNESIUM - Normal    Magnesium 1.88     TROPONIN I, HIGH SENSITIVITY - Normal    Troponin I, High Sensitivity 3      Narrative:     Less than 99th percentile of normal range cutoff-  Female and children under 18 years old <14 ng/L; Male <21 ng/L: Negative  Repeat testing should be performed if clinically indicated.     Female and children under 18 years old 14-50 ng/L; Male 21-50 ng/L:  Consistent with possible cardiac damage and possible increased clinical   risk. Serial measurements may help to assess extent of myocardial damage.     >50 ng/L: Consistent with cardiac damage, increased clinical  risk and  myocardial infarction. Serial measurements may help assess extent of   myocardial damage.      NOTE: Children less than 1 year old may have higher baseline troponin   levels and results should be interpreted in conjunction with the overall   clinical context.     NOTE: Troponin I testing is performed using a different   testing methodology at [facility] than at [facility]. Direct result comparisons should only   be made within the same method.   SARS-COV-2 AND INFLUENZA A/B PCR - Normal    Flu A Result Not Detected      Flu B Result Not Detected      Coronavirus 2019, PCR Not Detected      Narrative:     This assay is an FDA-cleared, in vitro diagnostic nucleic acid amplification test for the qualitative detection and differentiation of SARS CoV-2/ Influenza A/B from nasopharyngeal specimens collected from individuals with signs and symptoms of respiratory tract infections, and has been validated for use at [facility]. Negative results do not preclude COVID-19/ Influenza A/B infections and should not be used as the sole basis for diagnosis, treatment, or other management decisions. Testing for SARS CoV-2 is recommended only for patients who meet current clinical and/or epidemiological criteria defined by federal, state, or local public health directives.   URINALYSIS WITH REFLEX CULTURE AND MICROSCOPIC    Narrative:     The following orders were created for panel order Urinalysis with Reflex Culture and Microscopic.  Procedure                               Abnormality         Status                     ---------                               -----------         ------                     Urinalysis with Reflex C...[[id number]]                                                 Extra Urine Gray Tube[[id number]]                                                         Please view results for these tests on the individual orders.   URINALYSIS WITH REFLEX CULTURE  AND MICROSCOPIC   EXTRA URINE GRAY TUBE        Imaging  XR chest 1 view   Final Result   Left basilar airspace opacity, as above. Clinical correlation and   continued follow-up until clearing is recommended.        MACRO:   None.        Signed by: Steven Alvarez 3/30/2025 10:40 AM   Dictation workstation:   [id number]      CT head wo IV contrast   Final Result   No evidence of acute cortical infarct or intracranial hemorrhage.        MACRO:   None             Signed by: Steven Alvarez 3/30/2025 10:36 AM   Dictation workstation:   [id number]           ED Course  Diagnoses as of 03/30/25 1240   Dizziness        Medical Decision Making: She did not appear ill or toxic.  Vital signs reviewed and stable.  She is neurologically intact.  Will medicate her with meclizine and Zofran and perform comprehensive workup.  Stroke alert was not called as symptoms improve at rest.  Her NIH is also 0.    Differential diagnoses considered: Peripheral vertigo, UTI, intracranial hemorrhage, electrolyte normalities, COVID, influenza, viral illness, others    Medications given: Oral meclizine, IV Zofran    EKG interpreted by myself and ED attending: Atrial fibrillation.  Ventricular rate 69 bpm.  No acute ST elevations or depressions.  Flattened T waves throughout.  QTc 487.    I reviewed the labs from today.  No leukocytosis or leukopenia.  H&H is stable.  BUN normal with a creatinine 1.10.  Glucose 136.  Troponin negative.  BP slightly elevated at 191.  CT of the head showing no acute intracranial hemorrhage or infarct.  Chest x-ray showing left basilar airspace opacity.  She has not had any fever or cough.  She also does not have leukocytosis.  I do not believe that this is pneumonia.  She was reassessed at 1135.  She reports that the dizziness has slightly improved but she does not feel like she could ambulate.  I discussed with her that I recommend admission.  She is agreeable.  I consulted her PCP.  I spoke with Dr. Hamilton who will  admit.  Case discussed and evaluated with ED attending who is agreeable to patient plan of care.    Diagnosis: Dizziness  Plan: Admit     Jakob Rico PA-C  03/30/25 6551

## 2025-03-30 NOTE — PROGRESS NOTES
Pharmacy Medication History Review    Dea Duenas is a 74 y.o. female admitted for No Principal Problem: There is no principal problem currently on the Problem List. Please update the Problem List and refresh.. Pharmacy reviewed the patient's [id number] medications and allergies for accuracy.    The list below reflectives the updated PTA list. Please review each medication in order reconciliation for additional clarification and justification.  Prior to Admission medications    Medication Sig Start Date End Date Taking? Authorizing Provider   acetaminophen (Tylenol 8 Hour) 650 mg ER tablet Take 1-2 tablets (650-1,300 mg) by mouth every 8 hours if needed for mild pain (1 - 3) or headaches. 11/1/21  Yes Historical Provider, MD   apixaban (Eliquis) 5 mg tablet Take 1 tablet (5 mg) by mouth 2 times a day. Take 1 tab bid  Patient taking differently: Take 1 tablet (5 mg) by mouth 2 times a day. 1/28/25 4/28/25 Yes Jan Martin DO   atorvastatin (Lipitor) 80 mg tablet Take 1 tablet (80 mg) by mouth once daily.  Patient taking differently: Take 1 tablet (80 mg) by mouth once daily in the morning. 3/5/25 6/3/25 Yes Jan Martin DO   cetirizine (ZyrTEC) 10 mg tablet Take 1 tablet (10 mg) by mouth once daily as needed for allergies.  Patient taking differently: Take 1 tablet (10 mg) by mouth once daily at bedtime. 1/28/25 4/28/25 Yes Jan Martin DO   cholecalciferol (Vitamin D-3) 125 mcg (5000 UT) capsule Take 1 capsule (125 mcg) by mouth once daily.  Patient taking differently: Take 1 capsule (125 mcg) by mouth once daily in the morning. 1/28/25 4/28/25 Yes Jan Martin DO   fenofibrate (Tricor) 145 mg tablet Take 1 tablet (145 mg) by mouth once daily.  Patient taking differently: Take 1 tablet (145 mg) by mouth once daily at bedtime. 3/5/25 3/5/26 Yes Guillermo Maria MD   furosemide (Lasix) 40 mg tablet Take 1 tablet (40 mg) by mouth once daily.  Patient taking differently: Take 1 tablet (40 mg) by mouth  once daily in the morning. 1/28/25  Yes Jan D Martin, DO   levalbuterol (Xopenex) 45 mcg/actuation inhaler Inhale 2 puffs every 4 hours if needed for shortness of breath. 1/28/25  Yes Jan D Martin, DO   lisinopril 10 mg tablet Take 1 tablet (10 mg) by mouth once daily.  Patient taking differently: Take 1 tablet (10 mg) by mouth once daily in the morning. 1/28/25  Yes Jan D Martin, DO   metoprolol succinate XL (Toprol-XL) 50 mg 24 hr tablet Take 1 tablet (50 mg) by mouth 2 times a day. 1/28/25  Yes Jan D Martin, DO   nortriptyline (Pamelor) 10 mg capsule Take 1 capsule (10 mg) by mouth once daily at bedtime. 1/28/25  Yes Jan D Martin, DO   sennosides-docusate sodium (Landy-Colace) 8.6-50 mg tablet Take 1 tablet by mouth once daily as needed for constipation.   Yes Historical Provider, MD        The list below reflectives the updated allergy list. Please review each documented allergy for additional clarification and justification.  Allergies  Reviewed by Lacy Garrett RN on 3/30/2025        Severity Reactions Comments    Enviro Stress Low Runny nose             Below are additional concerns with the patient's PTA list.      Sagrario Mireles

## 2025-03-30 NOTE — H&P
History Of Present Illness  Dea Duenas is a 74 y.o. female with past medical Hx of A-fib on Eliquis, vertigo presenting to [facility] from home on 3/30 for dizziness beginning this morning soon after waking up at 3 AM.  Describes dizziness as room spinning around.  Ambulating and moving her head makes it worse.  When she sits still her symptoms improve.  Says feels similar to her vertigo she had about 5 years ago.  Has not had any episodes since.  At the time she was taking meclizine which did control her symptoms.  Has never been evaluated by neurologist.  Endorses nausea.  Denies extremity numbness, tingling, weakness, headache, recent trauma, blurred vision/changes in vision, no changes in hearing.  Denies F/C, cough/cold symptoms, CP/SOB, changes in BM/urination.  Compliant with home medications.    Did see ophthalmologist on 3/27 for formal checkup, noted to have cataracts and may need surgery.  However no acute changes in vision, headaches.  She uses cane for short distances and rollator for long distances.    On arrival hemodynamically stable.  Pertinent labs include creatinine 1.1 (baseline 1).  CXR reveals left basilar airspace opacity.  CT head unremarkable.  Received meclizine and Zofran in the ED which improved her symptoms.    Assessment and plan  Dea Duenas is a 74 y.o. female with past medical Hx of A-fib on Eliquis, vertigo presenting to [facility] from home on 3/30 for dizziness beginning this morning soon after waking up.  Admitted for continued gait instability despite vertigo symptoms improving.    #Dizziness  #Peripheral vertigo; BPPV?  -Began morning of admission, no other episodes aside from vertigo she had 5 years ago, no formal workup however meclizine did help.  -Worse with movement, short lasting episodes  -CT head unremarkable.  Meclizine/Zofran improved symptoms.  -PT/OT (can assess for BPPV via Kishan-Hallpike maneuver) consulted.  Dizziness had improved but difficulties  ambulating.  -Meclizine every 12 hours as needed.  Phenergan as needed, QTc 487  -If BPPV, treatment would be particle repositioning maneuvers, discontinuation of meclizine as medications do not help, and patient education/lifestyle changes    Chronic conditions  #A-fib on Eliquis-CHA2DS2-VASc 4.  Home Eliquis  #HTN/HLD-Home metoprolol succinate, lisinopril with holding parameters.  Home atorvastatin, fenofibrate  #CKD  #CHF-Home Lasix  #Severe sleep apnea-CPAP at night  #Seasonal allergies-Home cetirizine    Inpatient Checklist  Code Status: Full  DVT prophylaxis: Eliquis  Diet: Cardiac  Disposition: Observation    Anne Goodwin, DO  PGY-2, Internal Medicine  Please SecureChat for any further questions  This is a preliminary note, please await attending attestation for final A/P    Surgical History  She has a past surgical history that includes Other surgical history (12/16/2021); Other surgical history (12/16/2021); Other surgical history (12/16/2021); and Hysterectomy (1987).     Social History  She reports that she has never smoked. She has never used smokeless tobacco. She reports that she does not currently use alcohol. She reports that she does not use drugs.    Family History  Family History   Problem Relation Name Age of Onset    Colon cancer Mother      Rectal cancer Mother      Cirrhosis Father      Lung cancer Father      Heart attack Father      Glaucoma Brother      Colon cancer Mother's Sister       Allergies  Enviro stress     Physical Exam    General:  Pleasant and cooperative. No apparent distress. Obese  HEENT:  Normocephalic, atraumatic  Chest:  Clear to auscultation. Bilateral and appropriate chest rise  CV:  Regular rate and rhythm.    Abdomen: Abdomen is soft, non-tender, non-distended. BS +   Extremities:  No lower extremity edema or cyanosis.   Neurological:  Conversing and answering questions appropriately   Skin:  Warm and dry.    Last Recorded Vitals  /82 (BP Location: Right arm,  Patient Position: Lying)   Pulse 76   Temp 36 °C (96.8 °F) (Temporal)   Resp (!) 26   Wt 122 kg (270 lb)   SpO2 94%

## 2025-03-31 ENCOUNTER — PHARMACY VISIT (OUTPATIENT)
Dept: PHARMACY | Facility: CLINIC | Age: 75
End: 2025-03-31
Payer: COMMERCIAL

## 2025-03-31 VITALS
TEMPERATURE: 97.2 F | SYSTOLIC BLOOD PRESSURE: 105 MMHG | HEART RATE: 88 BPM | DIASTOLIC BLOOD PRESSURE: 58 MMHG | WEIGHT: 270 LBS | RESPIRATION RATE: 20 BRPM | BODY MASS INDEX: 47.84 KG/M2 | HEIGHT: 63 IN | OXYGEN SATURATION: 93 %

## 2025-03-31 LAB
ANION GAP SERPL CALC-SCNC: 12 MMOL/L (ref 10–20)
BUN SERPL-MCNC: 22 MG/DL (ref 6–23)
CALCIUM SERPL-MCNC: 9 MG/DL (ref 8.6–10.3)
CHLORIDE SERPL-SCNC: 105 MMOL/L (ref 98–107)
CO2 SERPL-SCNC: 28 MMOL/L (ref 21–32)
CREAT SERPL-MCNC: 1.17 MG/DL (ref 0.5–1.05)
EGFRCR SERPLBLD CKD-EPI 2021: 50 ML/MIN/1.73M*2
ERYTHROCYTE [DISTWIDTH] IN BLOOD BY AUTOMATED COUNT: 14.6 % (ref 11.5–14.5)
GLUCOSE SERPL-MCNC: 95 MG/DL (ref 74–99)
HCT VFR BLD AUTO: 44.2 % (ref 36–46)
HGB BLD-MCNC: 13.5 G/DL (ref 12–16)
HOLD SPECIMEN: NORMAL
MAGNESIUM SERPL-MCNC: 1.98 MG/DL (ref 1.6–2.4)
MCH RBC QN AUTO: 27.2 PG (ref 26–34)
MCHC RBC AUTO-ENTMCNC: 30.5 G/DL (ref 32–36)
MCV RBC AUTO: 89 FL (ref 80–100)
NRBC BLD-RTO: 0 /100 WBCS (ref 0–0)
PLATELET # BLD AUTO: 208 X10*3/UL (ref 150–450)
POTASSIUM SERPL-SCNC: 3.7 MMOL/L (ref 3.5–5.3)
RBC # BLD AUTO: 4.97 X10*6/UL (ref 4–5.2)
SODIUM SERPL-SCNC: 141 MMOL/L (ref 136–145)
WBC # BLD AUTO: 7.6 X10*3/UL (ref 4.4–11.3)

## 2025-03-31 PROCEDURE — 97165 OT EVAL LOW COMPLEX 30 MIN: CPT | Mod: GO

## 2025-03-31 PROCEDURE — G0378 HOSPITAL OBSERVATION PER HR: HCPCS

## 2025-03-31 PROCEDURE — 2500000001 HC RX 250 WO HCPCS SELF ADMINISTERED DRUGS (ALT 637 FOR MEDICARE OP)

## 2025-03-31 PROCEDURE — 99239 HOSP IP/OBS DSCHRG MGMT >30: CPT

## 2025-03-31 PROCEDURE — 85027 COMPLETE CBC AUTOMATED: CPT

## 2025-03-31 PROCEDURE — 97161 PT EVAL LOW COMPLEX 20 MIN: CPT | Mod: GP

## 2025-03-31 PROCEDURE — 36415 COLL VENOUS BLD VENIPUNCTURE: CPT

## 2025-03-31 PROCEDURE — 83735 ASSAY OF MAGNESIUM: CPT

## 2025-03-31 PROCEDURE — 80048 BASIC METABOLIC PNL TOTAL CA: CPT

## 2025-03-31 PROCEDURE — RXMED WILLOW AMBULATORY MEDICATION CHARGE

## 2025-03-31 RX ORDER — MECLIZINE HYDROCHLORIDE 25 MG/1
25 TABLET ORAL 3 TIMES DAILY PRN
Qty: 30 TABLET | Refills: 0 | Status: SHIPPED | OUTPATIENT
Start: 2025-03-31 | End: 2025-04-30

## 2025-03-31 RX ADMIN — LISINOPRIL 10 MG: 10 TABLET ORAL at 09:59

## 2025-03-31 RX ADMIN — ATORVASTATIN CALCIUM 80 MG: 80 TABLET, FILM COATED ORAL at 09:59

## 2025-03-31 RX ADMIN — FUROSEMIDE 40 MG: 40 TABLET ORAL at 09:59

## 2025-03-31 RX ADMIN — APIXABAN 5 MG: 5 TABLET, FILM COATED ORAL at 09:59

## 2025-03-31 RX ADMIN — METOPROLOL SUCCINATE 50 MG: 50 TABLET, EXTENDED RELEASE ORAL at 09:59

## 2025-03-31 ASSESSMENT — PAIN - FUNCTIONAL ASSESSMENT
PAIN_FUNCTIONAL_ASSESSMENT: 0-10
PAIN_FUNCTIONAL_ASSESSMENT: 0-10

## 2025-03-31 ASSESSMENT — ACTIVITIES OF DAILY LIVING (ADL): BATHING_ASSISTANCE: MODIFIED INDEPENDENT (DEVICE)

## 2025-03-31 ASSESSMENT — COGNITIVE AND FUNCTIONAL STATUS - GENERAL
DAILY ACTIVITIY SCORE: 22
TOILETING: A LITTLE
HELP NEEDED FOR BATHING: A LITTLE
CLIMB 3 TO 5 STEPS WITH RAILING: A LITTLE
MOBILITY SCORE: 23

## 2025-03-31 ASSESSMENT — PAIN SCALES - GENERAL
PAINLEVEL_OUTOF10: 0 - NO PAIN
PAINLEVEL_OUTOF10: 0 - NO PAIN

## 2025-03-31 NOTE — DISCHARGE SUMMARY
Discharge Diagnosis  #Dizziness  #Peripheral vertigo; suspected BPPV  #A-fib on Eliquis  #HTN/HLD  #CKD  #CHF  #Severe sleep apnea  #Seasonal allergies    Discharge Meds     Medication List      START taking these medications     meclizine 25 mg tablet; Commonly known as: Antivert; Take 1 tablet (25   mg) by mouth 3 times a day as needed for dizziness (vertigo).     CHANGE how you take these medications     atorvastatin 80 mg tablet; Commonly known as: Lipitor; Take 1 tablet (80   mg) by mouth once daily.; What changed: when to take this   cholecalciferol 125 mcg (5,000 units) capsule; Commonly known as:   Vitamin D-3; Take 1 capsule (125 mcg) by mouth once daily.; What changed:   when to take this   fenofibrate 145 mg tablet; Commonly known as: Tricor; Take 1 tablet (145   mg) by mouth once daily.; What changed: when to take this   furosemide 40 mg tablet; Commonly known as: Lasix; Take 1 tablet (40 mg)   by mouth once daily.; What changed: when to take this   lisinopril 10 mg tablet; Take 1 tablet (10 mg) by mouth once daily.;   What changed: when to take this     CONTINUE taking these medications     acetaminophen 650 mg ER tablet; Commonly known as: Tylenol 8 HOUR   apixaban 5 mg tablet; Commonly known as: Eliquis; Take 1 tablet (5 mg)   by mouth 2 times a day. Take 1 tab bid   cetirizine 10 mg tablet; Commonly known as: ZyrTEC; Take 1 tablet (10   mg) by mouth once daily as needed for allergies.   levalbuterol 45 mcg/actuation inhaler; Commonly known as: Xopenex;   Inhale 2 puffs every 4 hours if needed for shortness of breath.   metoprolol succinate XL 50 mg 24 hr tablet; Commonly known as:   Toprol-XL; Take 1 tablet (50 mg) by mouth 2 times a day.   nortriptyline 10 mg capsule; Commonly known as: Pamelor; Take 1 capsule   (10 mg) by mouth once daily at bedtime.   sennosides-docusate sodium 8.6-50 mg tablet; Commonly known as:   Landy-Colace       Test Results Pending At Discharge  Pending Labs       No current  pending labs.            Hospital Course  Dea Duenas is a 74 y.o. female with past medical Hx of A-fib on Eliquis, vertigo was admitted to [facility] on 3/30 for dizziness.  Patient described it as sensation of room spinning around.  She felt it was similar to an episode she had about 5 years ago with improvement after taking meclizine.  Patient says her symptoms were worse with positional changes like getting up from sofa or going towards the bathroom.  In the ED patient was hemodynamically stable, chest x-ray revealed a left basilar airspace opacity, clinically patient did not have any shortness of breath, no leukocytosis or fever.   CT scan of the head was unremarkable.  Patient received meclizine and Zofran in the ED with improvement of symptoms.  Physical and Occupational Therapy were consulted to assist with vestibular maneuvers.  Meclizine was ordered every 12 hours as needed for dizziness and Phenergan as needed for nausea.  The following day, patient's dizziness improved, hints exam normal, neurological exam was intact, no ataxia or dysarthria.  Patient has been referred to outpatient vestibular therapy.  She has been advised to take meclizine scheduled 3 times a day till she feels dizziness resolves.  Have also attached vestibular exercises for patient on AVS.  She has been asked to follow-up with her primary care provider for post hospitalization follow-up.    Of note patient was found to be in atrial fibrillation on admission to the ED.  She has a history of paroxysmal atrial fibrillation.  During admission patient was placed on telemetry and converted back to normal sinus rhythm and was rate controlled.  She has also been advised to follow-up with her primary cardiologist for posthospitalization follow-up.      Pertinent Physical Exam At Time of Discharge  Physical Exam  Constitutional:       Appearance: She is obese.   HENT:      Head: Normocephalic.      Mouth/Throat:      Mouth: Mucous membranes  are moist.   Eyes:      Conjunctiva/sclera: Conjunctivae normal.   Cardiovascular:      Rate and Rhythm: Normal rate.      Heart sounds: Normal heart sounds.   Pulmonary:      Breath sounds: Normal breath sounds.   Abdominal:      Palpations: Abdomen is soft.   Musculoskeletal:         General: Normal range of motion.      Cervical back: Normal range of motion.   Skin:     General: Skin is warm.   Neurological:      General: No focal deficit present.   Psychiatric:         Mood and Affect: Mood normal.           Outpatient Follow Up  Future Appointments   Date Time Provider Department Center   4/11/2025  2:30 PM Jan Martin DO [id number] [place]   7/29/2025 11:30 AM Jan Martin DO [id number] [place]   8/28/2025  9:45 AM Renetta Ca MD [id number] [place]   9/9/2025 11:45 AM Guillermo Maria MD [id number] [place]         Emely Cobb MD  PGY1 IM

## 2025-03-31 NOTE — DISCHARGE INSTRUCTIONS
We have prescribed a medication called meclizine 25 mg, which you can take scheduled 3 times a day to your dizziness persists.  You can take it as needed 3 times a day after that.    We recommended to follow-up with your primary care provider for posthospitalization follow-up.    We have placed a referral to vestibular therapy this will help with your dizziness.  They will schedule an appointment with you.    We have attached vestibular exercises you can follow to help with your dizziness.    Please follow-up with your primary cardiologist for posthospitalization follow-up.

## 2025-03-31 NOTE — CARE PLAN
The patient's goals for the shift include      The clinical goals for the shift include Patient will be free of falls

## 2025-03-31 NOTE — HOSPITAL COURSE
Dea Duenas is a 74 y.o. female with past medical Hx of A-fib on Eliquis, vertigo presenting to [facility] from home on 3/30 for dizzines beginning this morning soon after waking up at 3 AM.

## 2025-03-31 NOTE — PROGRESS NOTES
Occupational Therapy    Occupational Therapy    Evaluation    Patient Name: Dea Duenas  MRN: [id number]  Today's Date: 3/31/2025  Time Calculation  Start Time: 0855  Stop Time: 0920  Time Calculation (min): 25 min  332/332-A    Assessment  IP OT Assessment  OT Assessment: pt. appears to be at her functional baseline for adls  Barriers to Discharge Home: No anticipated barriers  Evaluation/Treatment Tolerance: Patient tolerated treatment well  Medical Staff Made Aware: Yes  End of Session Communication: Bedside nurse  End of Session Patient Position: Bed, 2 rail up, Alarm off, not on at start of session (call light in reach. all needs met)    Plan:  No Skilled OT: No acute OT goals identified  OT Frequency: OT eval only  OT Discharge Recommendations: No OT needed after discharge  OT Recommended Transfer Status:  (sba)  OT - OK to Discharge: Yes (once cleared by medical team)    Subjective     Current Problem:  1. Dizziness  meclizine (Antivert) 25 mg tablet    Referral to Physical Therapy          General:  General  Reason for Referral: OT eval and treat for adls  Referred By: Anne Goodwin DO  Past Medical History Relevant to Rehab: 74 year old female admit with c/o of dizziness with feeling that the room was spinning and stomach upset.   PMHX: afib, chf, ckd, htn, hld, vertigo, lumbar spondylosis/radiculopathy, dio, herniated lumbar disc  Family/Caregiver Present: No  Co-Treatment: PT  Co-Treatment Reason: to maximize pt. safety and mobility  Prior to Session Communication: Bedside nurse  Patient Position Received: Bed, 2 rail up, Alarm off, not on at start of session  General Comment: pt. pleasant and agreeable to therapy evaluation    Precautions:  Hearing/Visual Limitations: wears glasses  Medical Precautions: Fall precautions    Pain:  Pain Assessment  Pain Assessment: 0-10  0-10 (Numeric) Pain Score: 0 - No pain    Objective     Cognition:  Overall Cognitive Status: Within Functional Limits  Orientation  Level: Oriented X4     Home Living:  Type of Home: House  Lives With: Spouse (and adult son and dtr)  Home Adaptive Equipment: Walker rolling or standard, Sock aid, Reacher, Wheelchair-manual (rollator, bedrail)  Home Layout: One level, Laundry in basement  Home Access: Ramped entrance  Bathroom Shower/Tub: Tub/shower unit (transfer tub bench and shower chair, grab bar, hhs)  Bathroom Toilet: Standard     Prior Function:  Level of [place]: Independent with ADLs and functional transfers, Needs assistance with homemaking (dtr. and/or son do cooking/cleaning/laudnry/shopping, most of driving. pt. can drive)  Hand Dominance: Right  Prior Function Comments: pt. uses st. cane inside and rollator outside for longer distances.    ADL:  Eating Assistance: Independent  Grooming Assistance: Independent  Bathing Assistance: Modified independent (Device)  UE Dressing Assistance: Independent  Toileting Assistance with Device: Modified independent    Bed Mobility/Transfers:   Bed Mobility  Bed Mobility:  (mod. ind. supine<>sit from high bed)  Transfers  Transfer:  (sba sit to stand)    Ambulation/Gait Training:  Functional Mobility  Functional Mobility Performed:  (pt. ambulated with rolling walker down hallway with gait belt with sba)    Sitting Balance:  Static Sitting Balance  Static Sitting-Level of Assistance: Independent    Standing Balance:  Static Standing Balance  Static Standing-Level of Assistance: Close supervision    Sensation:  Sensation Comment: pt. c/o numbness in fingers    Strength:  Strength Comments: bue strength 4/5    Coordination:  Movements are Fluid and Coordinated: Yes     Hand Function:  Hand Function  Gross Grasp: Functional  Coordination: Functional    Extremities:   RUE : Within Functional Limits   LUE: Within Functional Limits    Outcome Measures: [facility] Daily Activity  Putting on and taking off regular lower body clothing: None  Bathing (including washing, rinsing, drying): A little  Putting on  and taking off regular upper body clothing: None  Toileting, which includes using toilet, bedpan or urinal: A little  Taking care of personal grooming such as brushing teeth: None  Eating Meals: None  Daily Activity - Total Score: 22     EDUCATION: N/A       Goals: N/A

## 2025-03-31 NOTE — PROGRESS NOTES
Physical Therapy      Physical Therapy Evaluation    Patient Name: Dea Duenas  MRN: [id number]  Today's Date: 3/31/2025   Time Calculation  Start Time: 0856  Stop Time: 0920  Time Calculation (min): 24 min  332/332-A    Assessment/Plan   PT Assessment  PT Assessment Results: Decreased endurance  Rehab Prognosis: Good  Barriers to Discharge Home: Physical needs  Physical Needs: Intermittent ADL assistance needed  Evaluation/Treatment Tolerance: Patient tolerated treatment well  Medical Staff Made Aware: Yes  Strengths: Living arrangement secure, Support of extended family/friends  Barriers to Participation: Comorbidities  End of Session Communication: Bedside nurse  Assessment Comment: Patient demonstrates modified independence with functional mobility for bed mobility and transfers. SBA for gait for safety with movement. Symptoms have resolved at this time. No further acute care needs.  End of Session Patient Position: Bed, 2 rail up, Alarm off, not on at start of session  IP OR SWING BED PT PLAN  Inpatient or Swing Bed: Inpatient  PT Plan  Treatment/Interventions: Bed mobility, Transfer training, Gait training  PT Plan: PT Eval only  PT Eval Only Reason: No acute PT needs identified  PT Frequency: PT eval only  PT Discharge Recommendations: No further acute PT  PT Recommended Transfer Status: Stand by assist  PT - OK to Discharge: Yes (When cleared by medical staff.)    Subjective     Current Problem:  1. Dizziness          Patient Active Problem List   Diagnosis    Atrial fibrillation (Multi)    Chronic lumbar radiculopathy    Hematochezia    HFrEF (heart failure with reduced ejection fraction)    Hyperlipidemia    Lumbar spondylosis    Morbid obesity (Multi)    CARI (obstructive sleep apnea)    Chronic kidney disease, stage 3a (Multi)    Benign essential hypertension    Carpal tunnel syndrome    Dyspnea on exertion    Herniated lumbar intervertebral disc    Dizziness       General Visit  Information:  General  Reason for Referral: PT Eval and Treat: Admit with dizziness, stomach upset. R/O vertigo  Referred By: Anne Goodwin DO  Family/Caregiver Present: No  Co-Treatment: OT  Co-Treatment Reason: Maximize patient safety and mobility  Prior to Session Communication: Bedside nurse  Patient Position Received: Bed, 2 rail up, Alarm off, not on at start of session  General Comment: 74 year old female admit with c/o of dizziness with feeling that the room was spinning and stomach upset.    Home Living:  Home Living  Type of Home: House  Lives With: Spouse, Adult children  Home Adaptive Equipment: Cane, Walker rolling or standard, Wheelchair-manual, Reacher, Sock aid  Home Layout: Two level, Laundry in basement, Full bath main level  Home Access: Stairs to enter without rails  Entrance Stairs-Number of Steps: 1  Bathroom Shower/Tub: Tub/shower unit  Bathroom Toilet: Standard  Bathroom Equipment: Grab bars in shower, Tub transfer bench, Hand-held shower hose  Home Living Comments: Patient lives in house with  (w/c bound), son and daughter.  1st floor bed and bath, tub shower.    Prior Level of Function:  Prior Function Per Pt/Caregiver Report  Level of [place]: Independent with ADLs and functional transfers, Needs assistance with homemaking  Receives Help From: Family  Leisure: Enjoys reading, games on the computer and watching tv.  Hand Dominance: Right  Prior Function Comments: Patient indep with dressing, bathing, ambulates short distances with cane, rollator for longer and community distances.  Son is primary caregiver for , manages household tasks, drives. Daughter does the cooking, shares laundry with son.    Precautions:  Precautions  Hearing/Visual Limitations: wears glasses  Medical Precautions: Fall precautions  Precautions Comment: Alert for falls due to dizziness         Objective     Pain:  Pain Assessment  Pain Assessment: 0-10  0-10 (Numeric) Pain Score: 0 - No  pain    Cognition:  Cognition  Overall Cognitive Status: Within Functional Limits  Orientation Level: Oriented X4    General Assessments:  General Observation  General Observation: Patient reports symptoms have resolved.   Activity Tolerance  Endurance: Tolerates 10 - 20 min exercise with multiple rests  Activity Tolerance Comments: Increased SOB with exertion  Sensation  Light Touch: Partial deficits in the RUE, Partial deficits in the LUE  Sensation Comment: C/o constant numbness in bilat hands.  Strength  Strength Comments: Overall grossly 5/5     Coordination  Movements are Fluid and Coordinated: Yes  Heel to Shin: Intact  Coordination Comment: Demonstrated donning slippers  Postural Control  Postural Control: Within Functional Limits  Posture Comment: Obese abdomen, rounded shoudlers, slight forward head.  Static Sitting Balance  Static Sitting-Level of Assistance: Independent  Dynamic Sitting Balance  Dynamic Sitting-Level of Assistance: Independent  Static Standing Balance  Static Standing-Level of Assistance: Distant supervision  Dynamic Standing Balance  Dynamic Standing-Level of Assistance: Close supervision    Functional Assessments:     Bed Mobility  Bed Mobility: Yes (Modified independent with all bed mobility)  Transfers  Transfer: Yes (SBA for sit to stand from EOB (tall bed))  Ambulation/Gait Training  Ambulation/Gait Training Performed: Yes (Instructed in gait using ww with cues for pacing of activity. Amb 100 ft w/ SBA without LOB including head turns in all planes.)          Extremity/Trunk Assessments:  RUE   RUE : Within Functional Limits  LUE   LUE: Within Functional Limits  RLE   RLE : Within Functional Limits  LLE   LLE : Within Functional Limits    Outcome Measures:     [facility] Basic Mobility  Turning from your back to your side while in a flat bed without using bedrails: None  Moving from lying on your back to sitting on the side of a flat bed without using bedrails: None  Moving to and from  bed to chair (including a wheelchair): None  Standing up from a chair using your arms (e.g. wheelchair or bedside chair): None  To walk in hospital room: None  Climbing 3-5 steps with railing: A little  Basic Mobility - Total Score: 23                                                             Goals:  Encounter Problems       Encounter Problems (Active)       Pain - Adult            Education Comments  Educated patient on energy conservation techniques including pacing of activity and pursed lip breathing and use of equipment for support.

## 2025-03-31 NOTE — PROGRESS NOTES
Dea Duenas is a 74 y.o. female on day 0 of admission presenting with Dizziness.      Subjective       Objective     Last Recorded Vitals  /77   Pulse 90   Temp 35.8 °C (96.4 °F) (Tympanic)   Resp 18   Wt 122 kg (270 lb)   SpO2 96%   Intake/Output last 3 Shifts:    Intake/Output Summary (Last 24 hours) at 3/31/2025 0736  Last data filed at 3/30/2025 1844  Gross per 24 hour   Intake 480 ml   Output --   Net 480 ml       Admission Weight  Weight: 122 kg (270 lb) (03/30/25 1001)    Daily Weight  03/30/25 : 122 kg (270 lb)    Image Results  XR chest 1 view  Narrative: Interpreted By:  Steven Alvarez,   STUDY:  XR CHEST 1 VIEW  3/30/2025 10:36 am      INDICATION:  Signs/Symptoms:dizzy      COMPARISON:  10/30/2022      ACCESSION NUMBER(S):  [id number]      ORDERING CLINICIAN:  DONN GRAVES      TECHNIQUE:  A single AP portable radiograph of the chest was obtained.      FINDINGS:  Multiple cardiac monitoring leads are seen over the chest.  Left  basilar airspace consolidation is seen and may represent small  pleural effusion, atelectasis and/or pneumonia. No pneumothorax is  identified. The cardiac silhouette is mildly prominent, similar to  prior studies.      Impression: Left basilar airspace opacity, as above. Clinical correlation and  continued follow-up until clearing is recommended.      MACRO:  None.      Signed by: Steven Alvarez 3/30/2025 10:40 AM  Dictation workstation:   [id number]  CT head wo IV contrast  Narrative: Interpreted By:  Steven Alvarez,   STUDY:  CT HEAD WO IV CONTRAST; 3/30/2025 10:30 am      INDICATION:  Signs/Symptoms:dizzy.      COMPARISON:  None.      ACCESSION NUMBER(S):  [id number]      ORDERING CLINICIAN:  DONN GRAVES      TECHNIQUE:  Contiguous axial CT images were obtained through the head at 5 mm  slice thickness without contrast administration.      FINDINGS:  INTRACRANIAL:  The ventricles, sulci and basal cisterns are within normal limits for  size and  configuration. The grey-white differentiation is intact.  There is no mass effect or midline shift. There is no extraaxial  fluid collection. There is no intracranial hemorrhage.  The calvarium  is unremarkable.      EXTRACRANIAL:  Visualized paranasal sinuses and mastoids are clear.      Impression: No evidence of acute cortical infarct or intracranial hemorrhage.      MACRO:  None          Signed by: Steven Alvarez 3/30/2025 10:36 AM  Dictation workstation:   [id number]      Physical Exam      Assessment/Plan   Dea is a 74-year-old female with a past medical history of atrial fibrillation (on Eliquis), vertigo, admitted to [facility] on 3/30 for dizziness beginning in the morning on waking up. She is admitted due to continued gait instability despite vertigo symptoms improving.    #Dizziness  #Peripheral vertigo  :: Worsening, short lasting episodes.  Did not have a formal workup for vertigo however meclizine helps.  :: CT scan of the head agree for any acute intracranial pathology  :: Status post meclizine and Zofran with improvement of symptoms  -Continue with meclizine every 12 hours as needed, Phenergan as needed  -PT OT consulted, patient will benefit from vestibular therapy    Chronic medical conditions:  #Atrial fibrillation on Eliquis [id number]  #Hypertension dyslipidemia: Continue home metoprolol, lisinopril with holding parameters, continue home atorvastatin and fenofibrate  #CKD  #CHF continue home Lasix  #Severe sleep apnea continue CPAP at night  #Seasonal allergies continue cetirizine    DVT prophylaxis, on Eliquis  Diet: Cardiac  Disposition: Observation  CODE STATUS: Full code      Emely Cobb MD  PGY1 IM

## 2025-04-01 ENCOUNTER — PATIENT OUTREACH (OUTPATIENT)
Dept: PRIMARY CARE | Facility: CLINIC | Age: 75
End: 2025-04-01
Payer: MEDICARE

## 2025-04-01 NOTE — PROGRESS NOTES
Discharge Facility: [facility]  Discharge Diagnosis: dizziness  Admission Date: 3/30/25  Discharge Date: 3/31/25    PCP Appointment Date: 4/11/25  Specialist Appointment Date: N/A  Hospital Encounter and Summary Linked: Yes  ED to Hosp-Admission (Discharged) with Too Reece DO; Petros Cristobal DO (03/30/2025)   See discharge assessment below for further details    Wrap Up  Wrap Up Additional Comments: Kelli is doing much better since coming home. No further dizziness noted. Using meclizine as needed. Plans to schedule PT for further exercises. No other questions or concerns at the moment. Aware to call before appt with nonurgent concerns. (4/1/2025 12:31 PM)  Call End Time: 1036 (4/1/2025 12:31 PM)    Engagement  Call Start Time: 1030 (4/1/2025 12:31 PM)    Medications  Medications reviewed with patient/caregiver?: Yes (4/1/2025 12:31 PM)  Is the patient having any side effects they believe may be caused by any medication additions or changes?: No (4/1/2025 12:31 PM)  Does the patient have all medications ordered at discharge?: Yes (4/1/2025 12:31 PM)  Care Management Interventions: Provided patient education (4/1/2025 12:31 PM)  Prescription Comments: ON meclizine as needed (4/1/2025 12:31 PM)  Is the patient taking all medications as directed (includes completed medication regime)?: Yes (4/1/2025 12:31 PM)  Care Management Interventions: Provided patient education (4/1/2025 12:31 PM)  Medication Comments: No issues with medication (4/1/2025 12:31 PM)    Appointments  Does the patient have a primary care provider?: Yes (4/1/2025 12:31 PM)  Care Management Interventions: Verified appointment date/time/provider (4/1/2025 12:31 PM)  Has the patient kept scheduled appointments due by today?: Yes (4/1/2025 12:31 PM)    Patient Teaching  Does the patient have access to their discharge instructions?: Yes (4/1/2025 12:31 PM)  Care Management Interventions: Reviewed instructions with patient (4/1/2025 12:31 PM)  What is  the patient's perception of their health status since discharge?: Improving (4/1/2025 12:31 PM)  Is the patient/caregiver able to teach back the hierarchy of who to call/visit for symptoms/problems? PCP, Specialist, Home Health nurse, Urgent Care, ED, 911: Yes (4/1/2025 12:31 PM)  Patient/Caregiver Education Comments: Aware to use caution when changing positions, drink plenty of fluids (4/1/2025 12:31 PM)

## 2025-04-08 DIAGNOSIS — I50.20 HFREF (HEART FAILURE WITH REDUCED EJECTION FRACTION): Primary | Chronic | ICD-10-CM

## 2025-04-11 ENCOUNTER — APPOINTMENT (OUTPATIENT)
Dept: PRIMARY CARE | Facility: CLINIC | Age: 75
End: 2025-04-11
Payer: MEDICARE

## 2025-04-11 VITALS
DIASTOLIC BLOOD PRESSURE: 70 MMHG | OXYGEN SATURATION: 95 % | WEIGHT: 272 LBS | SYSTOLIC BLOOD PRESSURE: 122 MMHG | BODY MASS INDEX: 48.18 KG/M2 | HEART RATE: 83 BPM

## 2025-04-11 DIAGNOSIS — R42 DIZZINESS: ICD-10-CM

## 2025-04-11 PROCEDURE — 1123F ACP DISCUSS/DSCN MKR DOCD: CPT | Performed by: INTERNAL MEDICINE

## 2025-04-11 PROCEDURE — 1159F MED LIST DOCD IN RCRD: CPT | Performed by: INTERNAL MEDICINE

## 2025-04-11 PROCEDURE — 1036F TOBACCO NON-USER: CPT | Performed by: INTERNAL MEDICINE

## 2025-04-11 PROCEDURE — 3074F SYST BP LT 130 MM HG: CPT | Performed by: INTERNAL MEDICINE

## 2025-04-11 PROCEDURE — 99495 TRANSJ CARE MGMT MOD F2F 14D: CPT | Performed by: INTERNAL MEDICINE

## 2025-04-11 PROCEDURE — 1158F ADVNC CARE PLAN TLK DOCD: CPT | Performed by: INTERNAL MEDICINE

## 2025-04-11 PROCEDURE — 1160F RVW MEDS BY RX/DR IN RCRD: CPT | Performed by: INTERNAL MEDICINE

## 2025-04-11 PROCEDURE — 3078F DIAST BP <80 MM HG: CPT | Performed by: INTERNAL MEDICINE

## 2025-04-11 RX ORDER — MECLIZINE HYDROCHLORIDE 25 MG/1
25 TABLET ORAL 3 TIMES DAILY PRN
Qty: 90 TABLET | Refills: 2 | Status: SHIPPED | OUTPATIENT
Start: 2025-04-11 | End: 2025-05-11

## 2025-04-11 ASSESSMENT — PATIENT HEALTH QUESTIONNAIRE - PHQ9
2. FEELING DOWN, DEPRESSED OR HOPELESS: NOT AT ALL
SUM OF ALL RESPONSES TO PHQ9 QUESTIONS 1 AND 2: 0
1. LITTLE INTEREST OR PLEASURE IN DOING THINGS: NOT AT ALL

## 2025-04-11 NOTE — PROGRESS NOTES
Chief Complaint:     HPI:  Subjective   Patient ID: Dea Duenas is a 74 y.o. female who presents for Hospital Follow-up and Med Refill.  HPI        year old female with past medical history of hypertension, hyperlipidemia, diverticulitis, chronic lumbar radiculopathy, carpal tunnel syndrome of the right hand, and osteoarthritis, vertigo  CARI CPAP  Urgent care visit 11/2021 for carpal tunnel of the right hand. Provided with wrist brace at the time.   ER presentation November 3, 2022 respiratory failure secondary to CHF exacerbation 4 to 45% systolic new onset A. fib CT PE negative  ER presentation March 30, 2025 dizziness severe A-fib RVR BPPV diagnosed better with meclizine CT head negative    Patient states she feels better at present she did have severe dizziness and vertigo better at present grade 0-10 she states she did notice it was present when she had the A-fib RVR in the hospital seem to get better when she went to normal sinus rhythm the meclizine also has been helping  Denies headaches vision changes diplopia chest pain dyspnea nausea vomiting fever chills otalgia otorrhea hearing loss paralysis paresthesias          Health Maintenance:      Colonoscopy: 2022 due 2027      Mammogram: 2025      Pelvic/Pap:      Low dose chest CT:      Aorta duplex:      Optho:      Podiatry:        Vaccines:      Prevnar 20:      Prevnar 13:      Pneumovax 23:      Tdap:      Shingrix:      COVID:      Influenza:        ROS:      General: She is looking forward to traveling [facility] [place] [facility]chills/[facility] loss      Head: Intermittent dizziness lightheadedness resolved at present denies HA/trauma/masses/dizziness      Eyes: denies vision change/loss of vision/blurry vision/diplopia/eye pain      Ears: denies hearing loss/tinnitus/otalgia/otorrhea      Nose: denies nasal drainage/anosmia      Throat: denies dysphagia/odynophagia      Lymphatics: denies lymph node swelling      Cardiac: denies  "CP/palpitations/orthopnea/PND      Pulmonary: denies dyspnea/cough/wheezing      GI: Occasional constipation gets better with 1 Colace a day denies abd pain/n/v/diarrhea/melena/hematochezia/hematemesis      : denies dysuria/hematuria/change frequency      Genital: denies genital discharge/lesions      Skin: denies rashes/lesions/masses      MSK occasionally she feels her neck crack like there is arthritis there when she turns it: denies weakness/swelling/edema/gait imbalance/pain      Neuro: Vertigo now resolved ;occasional paresthesias bilateral hands feet toes resolved at present denies paresthesias/seizures/dysarthria      Psych: denies depression/anxiety/suicidal or homicidal ideations            Objective   /70   Pulse 83   Wt 123 kg (272 lb)   SpO2 95%   BMI 48.18 kg/m²      Physical Exam:     General: AO3, NAD     Head: atraumatic/NC     Eyes: EOMI/PERRLA. Negative APD     Ears: TM pearly gray, EAC clear. No lesions or erythema     Nose: symmetric nares, no discharge     Throat: trachea midline, uvula midline pink mucosa. No thyromegaly     Lymphatics: no cervical/supraclavicular/ant or posterior cervical adenopathy/axillary/inguinal adenopathy     Breast: not examined     Chest: no deformity or tenderness to palpation     Pulm: CTA b/l, no wheeze/rhonchi/rales. nonlabored     Cardiac: RRR +s1s2, no m/r/g.      GI: soft, NT/ND. Normoactive Bsx4. No rebound/guarding.     Rectal: no examined     MSK: Ambulates with cane negative Tinel's bilateral 5/5 strength UE LE. No edema/clubbing/cyanosis     Skin: no rashes/lesions     Vascular: 2+ palp DP PT radials b/l. Negative carotid bruit     Neuro: CNII-XII intact. No focal deficits. Reflexes 2/4 brachioradialis bicep tricep patellar achilles. Finger to nose intact.     Psych: appropriate mood/affect                    No results found for: \"BMPR1A\", \"CBCDIF\"  Patient refused EMG at this time  Patient refused physical therapy at this time    Patient " deferred MRI of the brain    Assessment/Plan   Diagnoses and all orders for this visit:  Dizziness  Comments:  Suspect BPPV versus atrial fibrillation intermittent with RVR resolved at present  Orders:  -     meclizine (Antivert) 25 mg tablet; Take 1 tablet (25 mg) by mouth 3 times a day as needed for dizziness (vertigo).    Go to the ER for any severe recurrent dizziness vertigo or other concerning symptoms    Call follow-up with cardiology for follow-up recommendations noted Eliquis fenofibrate follow-up in 6 months    As you stated this time you prefer to do home physical therapy since this helps the condition that you are having        Please call and follow-up with pulmonary as ordered you stated you saw Dr Alexander at [place] but there is no records available on the chart    Please call and follow-up with hematology as ordered    Screening blood work due March thousand 26    Thank you for making appointment today Dea    Please stop at the  to schedule follow-up 3 months as we discussed     Jan Martin, , FACOI  Jan Martin DOFollow up and med refill    Active Problem List  Problem List       Atrial fibrillation (Multi) (Chronic)    Overview Signed 11/3/2023 11:18 AM by Aneta Beard     New 11/2022. CHADS Vasc 4. On Eliiquis  CVN 12/9/22 transiently successfule. Loaded wtih amio repeat CVN but unable to maintain sinus. Seen by Dr. HERNANDEZ and ablation not felt to be the correct option         HFrEF (heart failure with reduced ejection fraction) (Multi) (Chronic)    Overview Addendum 2/22/2024 10:15 PM by Guillermo Maria MD     EF 40-45% on echo of 10/31/2022, 8/26/23 AST EF 61%         Hyperlipidemia (Chronic)    Overview Signed 2/22/2024 10:14 PM by Guillermo Maria MD     High trig         CARI (obstructive sleep apnea) (Chronic)       Comprehensive Medical/Surgical/Social/Family History  Past Medical History:   Diagnosis Date    Atrial fibrillation (Multi) 03/29/2023    New 11/2022. CHADS Vasc 4. On  Eliiquis  CVN 12/9/22 transiently successfule. Loaded wtih amio repeat CVN but unable to maintain sinus. Seen by Dr. HERNANDEZ and ablation not felt to be the correct option    Body mass index (BMI) 50.0-59.9, adult (Multi) 04/22/2022    BMI 50.0-59.9, adult    Body mass index (BMI) 50.0-59.9, adult (Multi) 03/17/2022    BMI 50.0-59.9, adult    Cataract     Chronic kidney disease, stage 3a (Multi) 01/28/2025    Essential (primary) hypertension     Benign essential hypertension    HFrEF (heart failure with reduced ejection fraction) 03/29/2023    EF 40-45% on echo of 10/31/2022    Hyperlipidemia 03/29/2023    CARI (obstructive sleep apnea) 03/29/2023    Other intervertebral disc displacement, lumbar region     Lumbar herniated disc    Personal history of other diseases of the digestive system     History of diverticulitis    Personal history of other diseases of the digestive system     History of constipation    Personal history of other endocrine, nutritional and metabolic disease     History of mixed hyperlipidemia    Personal history of other endocrine, nutritional and metabolic disease     History of vitamin D deficiency    Personal history of other specified conditions     History of vertigo     Past Surgical History:   Procedure Laterality Date    HYSTERECTOMY  1987    OTHER SURGICAL HISTORY  12/16/2021    Colonoscopy    OTHER SURGICAL HISTORY  12/16/2021    Knee surgery    OTHER SURGICAL HISTORY  12/16/2021    Partial hysterectomy     Social History     Social History Narrative    Not on file         Allergies and Medications  Enviro stress  Current Outpatient Medications on File Prior to Visit   Medication Sig Dispense Refill    apixaban (Eliquis) 5 mg tablet Take 1 tablet (5 mg) by mouth 2 times a day. Take 1 tab bid 180 tablet 1    atorvastatin (Lipitor) 80 mg tablet Take 1 tablet (80 mg) by mouth once daily. 90 tablet 1    cetirizine (ZyrTEC) 10 mg tablet Take 1 tablet (10 mg) by mouth once daily as needed for  allergies. 90 tablet 1    cholecalciferol (Vitamin D-3) 125 mcg (5000 UT) capsule Take 1 capsule (125 mcg) by mouth once daily. 90 capsule 1    fenofibrate (Tricor) 145 mg tablet Take 1 tablet (145 mg) by mouth once daily. 30 tablet 11    furosemide (Lasix) 40 mg tablet Take 1 tablet (40 mg) by mouth once daily. 90 tablet 1    lisinopril 10 mg tablet Take 1 tablet (10 mg) by mouth once daily. 90 tablet 1    metoprolol succinate XL (Toprol-XL) 50 mg 24 hr tablet Take 1 tablet (50 mg) by mouth 2 times a day. 180 tablet 1    nortriptyline (Pamelor) 10 mg capsule Take 1 capsule (10 mg) by mouth once daily at bedtime. 90 capsule 1    sennosides-docusate sodium (Landy-Colace) 8.6-50 mg tablet Take 1 tablet by mouth once daily as needed for constipation.      [DISCONTINUED] meclizine (Antivert) 25 mg tablet Take 1 tablet (25 mg) by mouth 3 times a day as needed for dizziness (vertigo). 30 tablet 0    acetaminophen (Tylenol 8 Hour) 650 mg ER tablet Take 1-2 tablets (650-1,300 mg) by mouth every 8 hours if needed for mild pain (1 - 3) or headaches.      [DISCONTINUED] levalbuterol (Xopenex) 45 mcg/actuation inhaler Inhale 2 puffs every 4 hours if needed for shortness of breath. (Patient not taking: Reported on 4/11/2025) 15 g 3     No current facility-administered medications on file prior to visit.       Medications and Supplements  prescribed by me and other practitioners or clinical pharmacist (such as prescriptions, OTC's, herbal therapies and supplements) were reviewed and documented in the medical record.    Tobacco/Alcohol/Opioid use, as well as Illicit Drug Use was screened for/reviewed and documented in Social History section and medication list as appropriate  Activities of Daily Living  In your present state of health, do you have any difficulty performing the following activities?:   Preparing food and eating?: No  Bathing yourself: No  Getting dressed: No  Using the toilet:No  Moving around from place to place:  "No  In the past year have you fallen or had a near fall?:No    Depression Screen  (Note: if answer to either of the following is \"Yes\", then a more complete depression screening is indicated)   Q1: Over the past two weeks, have you felt down, depressed or hopeless? No  Q2: Over the past two weeks, have you felt little interest or pleasure in doing things? No    Current exercise habits: The patient does not participate in regular exercise at present.   Dietary issues discussed: Yes  Hearing difficulties: Yes  Safe in current home environment: yes  Visual Acuity assessed: no  Cognitive Impairment assessed: no       Advance directives  Advanced Care Planning (including a Living Will, Healthcare POA, as well as specific end of life choices and/or directives), was discussed for approximately 16 minutes with the patient and/or surrogate, voluntarily, and documented in the medical record.     Cardiac Risk Assessment  Cardiovascular risk was discussed and, if needed, lifestyle modifications recommended, including nutritional choices, exercise, and elimination of habits contributing to risk. We agreed on a plan to reduce the current cardiovascular risk based on above discussion as needed.  Aspirin use/disuse was discussed after reviewing the updated guidelines below:    Consider low dose Aspirin ( mg) use if the benefit for cardiovascular disease prevention outweighs risk for bleeding complications.   In general, low dose ASA should be considered:  In patients WITHOUT prior MI/stroke/PAD (primary prevention):   a. Age <60: Use if 10-year cardiovascular disease risk >20%, with discussion of risks and benefits with patient  b. Age 60-<70: Use if 10-year cardiovascular disease risk >20% and low bleeding (e.g., gastrointenstinal) risk, with discussion of risks and benefits with patient  c. Age >=70: Do not use    In patients WITH prior MI/stroke/PAD (secondary prevention):   Generally use unless extremely high bleeding " (e.g., gastrointenstinal) risk, with discussion of risks and benefits with patient    ROS otherwise negative aside from what was mentioned above in HPI.    Vitals  Vitals:    04/11/25 1414   BP: 122/70   Pulse: 83   SpO2: 95%     Body mass index is 48.18 kg/m².    Assessment and Plan:  Problem List Items Addressed This Visit       Dizziness    Relevant Medications    meclizine (Antivert) 25 mg tablet           During the course of the visit the patient was educated and counseled about age appropriate screening and preventive services. Completed preventive screenings were documented in the chart and orders were placed for outstanding screenings/procedures as documented in the Assessment and Plan.      Patient Instructions (the written plan) was given to the patient at check out.      Jan Martin, DO

## 2025-04-15 ENCOUNTER — PATIENT OUTREACH (OUTPATIENT)
Dept: PRIMARY CARE | Facility: CLINIC | Age: 75
End: 2025-04-15
Payer: MEDICARE

## 2025-05-15 ENCOUNTER — PATIENT OUTREACH (OUTPATIENT)
Dept: PRIMARY CARE | Facility: CLINIC | Age: 75
End: 2025-05-15
Payer: MEDICARE

## 2025-06-05 DIAGNOSIS — I48.0 PAROXYSMAL ATRIAL FIBRILLATION (MULTI): Chronic | ICD-10-CM

## 2025-06-05 DIAGNOSIS — E78.2 MIXED HYPERLIPIDEMIA: Chronic | ICD-10-CM

## 2025-06-05 DIAGNOSIS — I10 BENIGN ESSENTIAL HYPERTENSION: ICD-10-CM

## 2025-06-05 LAB
ALBUMIN SERPL-MCNC: 4.2 G/DL (ref 3.6–5.1)
ALP SERPL-CCNC: 45 U/L (ref 37–153)
ALT SERPL-CCNC: 23 U/L (ref 6–29)
ANION GAP SERPL CALCULATED.4IONS-SCNC: 14 MMOL/L (CALC) (ref 7–17)
AST SERPL-CCNC: 18 U/L (ref 10–35)
BILIRUB SERPL-MCNC: 0.8 MG/DL (ref 0.2–1.2)
BUN SERPL-MCNC: 40 MG/DL (ref 7–25)
CALCIUM SERPL-MCNC: 9.5 MG/DL (ref 8.6–10.4)
CHLORIDE SERPL-SCNC: 105 MMOL/L (ref 98–110)
CHOLEST SERPL-MCNC: 185 MG/DL
CHOLEST/HDLC SERPL: 3.5 (CALC)
CO2 SERPL-SCNC: 22 MMOL/L (ref 20–32)
CREAT SERPL-MCNC: 1.58 MG/DL (ref 0.6–1)
EGFRCR SERPLBLD CKD-EPI 2021: 34 ML/MIN/1.73M2
GLUCOSE SERPL-MCNC: 100 MG/DL (ref 65–99)
HDLC SERPL-MCNC: 53 MG/DL
LDLC SERPL CALC-MCNC: 107 MG/DL (CALC)
NONHDLC SERPL-MCNC: 132 MG/DL (CALC)
POTASSIUM SERPL-SCNC: 4.7 MMOL/L (ref 3.5–5.3)
PROT SERPL-MCNC: 6.4 G/DL (ref 6.1–8.1)
SODIUM SERPL-SCNC: 141 MMOL/L (ref 135–146)
TRIGL SERPL-MCNC: 137 MG/DL

## 2025-06-12 ENCOUNTER — TELEPHONE (OUTPATIENT)
Dept: PRIMARY CARE | Facility: CLINIC | Age: 75
End: 2025-06-12
Payer: MEDICARE

## 2025-06-12 DIAGNOSIS — M25.561 CHRONIC PAIN OF BOTH KNEES: Primary | ICD-10-CM

## 2025-06-12 DIAGNOSIS — G89.29 CHRONIC PAIN OF BOTH KNEES: Primary | ICD-10-CM

## 2025-06-12 DIAGNOSIS — M25.562 CHRONIC PAIN OF BOTH KNEES: Primary | ICD-10-CM

## 2025-06-24 ENCOUNTER — PATIENT OUTREACH (OUTPATIENT)
Dept: PRIMARY CARE | Facility: CLINIC | Age: 75
End: 2025-06-24
Payer: MEDICARE

## 2025-07-29 ENCOUNTER — APPOINTMENT (OUTPATIENT)
Dept: PRIMARY CARE | Facility: CLINIC | Age: 75
End: 2025-07-29
Payer: MEDICARE

## 2025-07-29 VITALS
SYSTOLIC BLOOD PRESSURE: 120 MMHG | DIASTOLIC BLOOD PRESSURE: 70 MMHG | WEIGHT: 272 LBS | HEIGHT: 63 IN | BODY MASS INDEX: 48.2 KG/M2

## 2025-07-29 DIAGNOSIS — E78.5 HYPERLIPIDEMIA, UNSPECIFIED HYPERLIPIDEMIA TYPE: ICD-10-CM

## 2025-07-29 DIAGNOSIS — M25.552 BILATERAL HIP PAIN: ICD-10-CM

## 2025-07-29 DIAGNOSIS — M25.511 CHRONIC RIGHT SHOULDER PAIN: ICD-10-CM

## 2025-07-29 DIAGNOSIS — R20.2 PARESTHESIAS: ICD-10-CM

## 2025-07-29 DIAGNOSIS — I50.9 HEART FAILURE, UNSPECIFIED: ICD-10-CM

## 2025-07-29 DIAGNOSIS — I48.91 UNSPECIFIED ATRIAL FIBRILLATION (MULTI): ICD-10-CM

## 2025-07-29 DIAGNOSIS — G89.29 CHRONIC RIGHT SHOULDER PAIN: ICD-10-CM

## 2025-07-29 DIAGNOSIS — I10 PRIMARY HYPERTENSION: ICD-10-CM

## 2025-07-29 DIAGNOSIS — M54.9 BACK PAIN, UNSPECIFIED BACK LOCATION, UNSPECIFIED BACK PAIN LATERALITY, UNSPECIFIED CHRONICITY: Primary | ICD-10-CM

## 2025-07-29 DIAGNOSIS — M25.551 BILATERAL HIP PAIN: ICD-10-CM

## 2025-07-29 DIAGNOSIS — Z00.00 WELLNESS EXAMINATION: ICD-10-CM

## 2025-07-29 PROCEDURE — 99214 OFFICE O/P EST MOD 30 MIN: CPT | Performed by: INTERNAL MEDICINE

## 2025-07-29 PROCEDURE — 1160F RVW MEDS BY RX/DR IN RCRD: CPT | Performed by: INTERNAL MEDICINE

## 2025-07-29 PROCEDURE — 3078F DIAST BP <80 MM HG: CPT | Performed by: INTERNAL MEDICINE

## 2025-07-29 PROCEDURE — G0439 PPPS, SUBSEQ VISIT: HCPCS | Performed by: INTERNAL MEDICINE

## 2025-07-29 PROCEDURE — 1159F MED LIST DOCD IN RCRD: CPT | Performed by: INTERNAL MEDICINE

## 2025-07-29 PROCEDURE — 3074F SYST BP LT 130 MM HG: CPT | Performed by: INTERNAL MEDICINE

## 2025-07-29 PROCEDURE — 1036F TOBACCO NON-USER: CPT | Performed by: INTERNAL MEDICINE

## 2025-07-29 PROCEDURE — 1170F FXNL STATUS ASSESSED: CPT | Performed by: INTERNAL MEDICINE

## 2025-07-29 PROCEDURE — 3008F BODY MASS INDEX DOCD: CPT | Performed by: INTERNAL MEDICINE

## 2025-07-29 RX ORDER — LEVALBUTEROL TARTRATE 45 UG/1
1-2 AEROSOL, METERED ORAL
COMMUNITY

## 2025-07-29 RX ORDER — FUROSEMIDE 40 MG/1
40 TABLET ORAL DAILY
Qty: 90 TABLET | Refills: 1 | Status: SHIPPED | OUTPATIENT
Start: 2025-07-29

## 2025-07-29 RX ORDER — LISINOPRIL 10 MG/1
10 TABLET ORAL DAILY
Qty: 90 TABLET | Refills: 1 | Status: SHIPPED | OUTPATIENT
Start: 2025-07-29

## 2025-07-29 RX ORDER — ATORVASTATIN CALCIUM 80 MG/1
80 TABLET, FILM COATED ORAL DAILY
Qty: 90 TABLET | Refills: 1 | Status: SHIPPED | OUTPATIENT
Start: 2025-07-29 | End: 2025-10-27

## 2025-07-29 ASSESSMENT — ACTIVITIES OF DAILY LIVING (ADL)
GROCERY_SHOPPING: INDEPENDENT
MANAGING_FINANCES: INDEPENDENT
TAKING_MEDICATION: INDEPENDENT
DOING_HOUSEWORK: INDEPENDENT
DRESSING: INDEPENDENT
BATHING: INDEPENDENT

## 2025-07-29 ASSESSMENT — ENCOUNTER SYMPTOMS
DEPRESSION: 0
OCCASIONAL FEELINGS OF UNSTEADINESS: 1
LOSS OF SENSATION IN FEET: 1

## 2025-07-29 NOTE — PROGRESS NOTES
Chief Complaint:     HPI: Wellness visit  Subjective   Patient ID: Dea Duenas is a 75 y.o. female who presents for No chief complaint on file..  HPI        year old female with past medical history of hypertension, hyperlipidemia, diverticulitis, chronic lumbar radiculopathy, carpal tunnel syndrome of the right hand, and osteoarthritis, vertigo  CARI CPAP  Urgent care visit 11/2021 for carpal tunnel of the right hand. Provided with wrist brace at the time.   ER presentation November 3, 2022 respiratory failure secondary to CHF exacerbation 4 to 45% systolic new onset A. fib CT PE negative  ER presentation March 30, 2025 dizziness severe A-fib RVR BPPV diagnosed better with meclizine CT head negative    Patient has 2 months of intermittent back pain low back thoracic back radiates to the top of the hips bilateral grade 7 out of 10 better when she takes Tylenol or lays down worse with standing  Paresthesias of the hands for years types a lot on the computer with her hands digits 1 through 3    Denies trauma paralysis paresthesias bowel incontinence saddle paresthesias urinary incontinence    Patient has a plan to [place] soon [facility] to celebrate 50th marriage anniversary        Health Maintenance:      Colonoscopy: 2022 due 2027      Mammogram: 2025      Pelvic/Pap:      Low dose chest CT:      Aorta duplex:      Optho:      Podiatry:        Vaccines:      Prevnar 20:      Prevnar 13:      Pneumovax 23:      Tdap:      Shingrix:      COVID:      Influenza:        ROS:      General:denies fever/chills/weight loss      Head: Dizziness now resolved with meclizine denies HA/trauma/masses/dizziness      Eyes: denies vision change/loss of vision/blurry vision/diplopia/eye pain      Ears: denies hearing loss/tinnitus/otalgia/otorrhea      Nose: denies nasal drainage/anosmia      Throat: denies dysphagia/odynophagia      Lymphatics: denies lymph node swelling      Cardiac: denies CP/palpitations/orthopnea/PND       "Pulmonary: denies dyspnea/cough/wheezing      GI: Occasional constipation gets better with 1 Colace a day denies abd pain/n/v/diarrhea/melena/hematochezia/hematemesis      : Occasionally feels some urinary urgency has to rush to get to the bathroom better at present denies dysuria/hematuria/change frequency      Genital: denies genital discharge/lesions      Skin: denies rashes/lesions/masses      MSK low back pain radiates to the hips bilateral denies weakness/swelling/edema/gait imbalance/pain      Neuro: Vertigo now resolved ;occasional paresthesias bilateral hands digits 1 through 3 denies paresthesias/seizures/dysarthria      Psych: denies depression/anxiety/suicidal or homicidal ideations            Objective   /70   Ht 1.6 m (5' 3\")   Wt 123 kg (272 lb)   BMI 48.18 kg/m²      Physical Exam:     General: AO3, NAD     Head: atraumatic/NC     Eyes: EOMI/PERRLA. Negative APD     Ears: TM pearly gray, EAC clear. No lesions or erythema     Nose: symmetric nares, no discharge     Throat: trachea midline, uvula midline pink mucosa. No thyromegaly     Lymphatics: no cervical/supraclavicular/ant or posterior cervical adenopathy/axillary/inguinal adenopathy     Breast: not examined     Chest: no deformity or tenderness to palpation     Pulm: CTA b/l, no wheeze/rhonchi/rales. nonlabored     Cardiac: RRR +s1s2, no m/r/g.      GI: soft, NT/ND. Normoactive Bsx4. No rebound/guarding.     Rectal: no examined     MSK: Ambulates with cane negative Tinel's bilateral 5/5 strength UE LE. No edema/clubbing/cyanosis     Skin: no rashes/lesions     Vascular: 2+ palp DP PT radials b/l. Negative carotid bruit     Neuro: CNII-XII intact. No focal deficits. Reflexes 2/4 brachioradialis bicep tricep patellar achilles. Finger to nose intact.     Psych: appropriate mood/affect                    No results found for: \"BMPR1A\", \"CBCDIF\"        Patient deferred MRI of the brain    Assessment/Plan   Diagnoses and all orders for this " visit:  Back pain, unspecified back location, unspecified back pain laterality, unspecified chronicity  -     XR thoracic spine 2 views; Future  -     XR lumbar spine 2-3 views; Future  -     Referral to Physical Therapy; Future  Hyperlipidemia, unspecified hyperlipidemia type  -     atorvastatin (Lipitor) 80 mg tablet; Take 1 tablet (80 mg) by mouth once daily.  Unspecified atrial fibrillation (Multi)  -     apixaban (Eliquis) 5 mg tablet; Take 1 tablet (5 mg) by mouth 2 times a day. Take 1 tab bid  Heart failure, unspecified  -     furosemide (Lasix) 40 mg tablet; Take 1 tablet (40 mg) by mouth once daily.  Primary hypertension  -     lisinopril 10 mg tablet; Take 1 tablet (10 mg) by mouth once daily.  Bilateral hip pain  Comments:  Mild osteoarthritis right hip with tendinitis  Orders:  -     XR hips bilateral 2 VW w pelvis when performed; Future  -     Referral to Physical Therapy; Future  Paresthesias  -     EMG & nerve conduction; Future  Wellness examination  Chronic right shoulder pain  Comments:  Suspect secondary to chronic severe osteoarthritis versus rotator cuff tear versus other    Go to the ER for any severe recurrent dizziness vertigo or other concerning symptoms    Call follow-up with cardiology for follow-up recommendations noted Eliquis fenofibrate follow-up in 6 months            Please call and follow-up with pulmonary as ordered you stated you saw Dr Alexander at [place] but there is no records available on the chart    Please call and follow-up with hematology as ordered    Screening blood work due March thousand 26    Thank you for making appointment today Dea    Please stop at the  to schedule follow-up 3 months as we discussed     Jan Martin DO, FACEDMUNDO Martin DOFollow up and med refill    Active Problem List  Problem List       Atrial fibrillation (Multi) (Chronic)    Overview Signed 11/3/2023 11:18 AM by Aneta Boyd 11/2022. CHADS Vasc 4. On Eliiquis  CVN  12/9/22 transiently successfule. Loaded wtih amio repeat CVN but unable to maintain sinus. Seen by Dr. HERNANDEZ and ablation not felt to be the correct option         HFrEF (heart failure with reduced ejection fraction) (Multi) (Chronic)    Overview Addendum 2/22/2024 10:15 PM by Guillermo Maria MD     EF 40-45% on echo of 10/31/2022, 8/26/23 AST EF 61%         Hyperlipidemia (Chronic)    Overview Signed 2/22/2024 10:14 PM by Guillermo Maria MD     High trig         CARI (obstructive sleep apnea) (Chronic)       Comprehensive Medical/Surgical/Social/Family History  Past Medical History:   Diagnosis Date    Atrial fibrillation (Multi) 03/29/2023    New 11/2022. CHADS Vasc 4. On Eliiquis  CVN 12/9/22 transiently successfule. Loaded wtih amio repeat CVN but unable to maintain sinus. Seen by Dr. HERNANDEZ and ablation not felt to be the correct option    Body mass index (BMI) 50.0-59.9, adult (Multi) 04/22/2022    BMI 50.0-59.9, adult    Body mass index (BMI) 50.0-59.9, adult (Multi) 03/17/2022    BMI 50.0-59.9, adult    Cataract     Chronic kidney disease, stage 3a (Multi) 01/28/2025    Essential (primary) hypertension     Benign essential hypertension    HFrEF (heart failure with reduced ejection fraction) 03/29/2023    EF 40-45% on echo of 10/31/2022    Hyperlipidemia 03/29/2023    CARI (obstructive sleep apnea) 03/29/2023    Other intervertebral disc displacement, lumbar region     Lumbar herniated disc    Personal history of other diseases of the digestive system     History of diverticulitis    Personal history of other diseases of the digestive system     History of constipation    Personal history of other endocrine, nutritional and metabolic disease     History of mixed hyperlipidemia    Personal history of other endocrine, nutritional and metabolic disease     History of vitamin D deficiency    Personal history of other specified conditions     History of vertigo     Past Surgical History:   Procedure Laterality Date    HYSTERECTOMY   1987    OTHER SURGICAL HISTORY  12/16/2021    Colonoscopy    OTHER SURGICAL HISTORY  12/16/2021    Knee surgery    OTHER SURGICAL HISTORY  12/16/2021    Partial hysterectomy     Social History     Social History Narrative    Not on file         Allergies and Medications  Enviro stress  Current Outpatient Medications on File Prior to Visit   Medication Sig Dispense Refill    levalbuterol (Xopenex) 45 mcg/actuation inhaler Inhale 1-2 puffs.      acetaminophen (Tylenol 8 Hour) 650 mg ER tablet Take 1-2 tablets (650-1,300 mg) by mouth every 8 hours if needed for mild pain (1 - 3) or headaches.      cetirizine (ZyrTEC) 10 mg tablet Take 1 tablet (10 mg) by mouth once daily as needed for allergies. 90 tablet 1    fenofibrate (Tricor) 145 mg tablet Take 1 tablet (145 mg) by mouth once daily. 30 tablet 11    metoprolol succinate XL (Toprol-XL) 50 mg 24 hr tablet Take 1 tablet (50 mg) by mouth 2 times a day. 180 tablet 1    nortriptyline (Pamelor) 10 mg capsule Take 1 capsule (10 mg) by mouth once daily at bedtime. 90 capsule 1    sennosides-docusate sodium (Landy-Colace) 8.6-50 mg tablet Take 1 tablet by mouth once daily as needed for constipation.      [DISCONTINUED] apixaban (Eliquis) 5 mg tablet Take 1 tablet (5 mg) by mouth 2 times a day. Take 1 tab bid 180 tablet 1    [DISCONTINUED] atorvastatin (Lipitor) 80 mg tablet Take 1 tablet (80 mg) by mouth once daily. 90 tablet 1    [DISCONTINUED] furosemide (Lasix) 40 mg tablet Take 1 tablet (40 mg) by mouth once daily. 90 tablet 1    [DISCONTINUED] lisinopril 10 mg tablet Take 1 tablet (10 mg) by mouth once daily. 90 tablet 1     No current facility-administered medications on file prior to visit.       Medications and Supplements  prescribed by me and other practitioners or clinical pharmacist (such as prescriptions, OTC's, herbal therapies and supplements) were reviewed and documented in the medical record.    Tobacco/Alcohol/Opioid use, as well as Illicit Drug Use was  "screened for/reviewed and documented in Social History section and medication list as appropriate  Activities of Daily Living  In your present state of health, do you have any difficulty performing the following activities?:   Preparing food and eating?: No  Bathing yourself: No  Getting dressed: No  Using the toilet:No  Moving around from place to place: No  In the past year have you fallen or had a near fall?:No    Depression Screen  (Note: if answer to either of the following is \"Yes\", then a more complete depression screening is indicated)   Q1: Over the past two weeks, have you felt down, depressed or hopeless? No  Q2: Over the past two weeks, have you felt little interest or pleasure in doing things? No    Current exercise habits: The patient does not participate in regular exercise at present.   Dietary issues discussed: Yes  Hearing difficulties: Yes  Safe in current home environment: yes  Visual Acuity assessed: no  Cognitive Impairment assessed: no       Advance directives  Advanced Care Planning (including a Living Will, Healthcare POA, as well as specific end of life choices and/or directives), was discussed for approximately 16 minutes with the patient and/or surrogate, voluntarily, and documented in the medical record.     Cardiac Risk Assessment  Cardiovascular risk was discussed and, if needed, lifestyle modifications recommended, including nutritional choices, exercise, and elimination of habits contributing to risk. We agreed on a plan to reduce the current cardiovascular risk based on above discussion as needed.  Aspirin use/disuse was discussed after reviewing the updated guidelines below:    Consider low dose Aspirin ( mg) use if the benefit for cardiovascular disease prevention outweighs risk for bleeding complications.   In general, low dose ASA should be considered:  In patients WITHOUT prior MI/stroke/PAD (primary prevention):   a. Age <60: Use if 10-year cardiovascular disease risk " >20%, with discussion of risks and benefits with patient  b. Age 60-<70: Use if 10-year cardiovascular disease risk >20% and low bleeding (e.g., gastrointenstinal) risk, with discussion of risks and benefits with patient  c. Age >=70: Do not use    In patients WITH prior MI/stroke/PAD (secondary prevention):   Generally use unless extremely high bleeding (e.g., gastrointenstinal) risk, with discussion of risks and benefits with patient    ROS otherwise negative aside from what was mentioned above in HPI.    Vitals  Vitals:    07/29/25 1129   BP: 120/70     Body mass index is 48.18 kg/m².    Assessment and Plan:  Problem List Items Addressed This Visit       Hyperlipidemia (Chronic)    Overview   High trig         Relevant Medications    atorvastatin (Lipitor) 80 mg tablet     Other Visit Diagnoses         Back pain, unspecified back location, unspecified back pain laterality, unspecified chronicity    -  Primary    Relevant Orders    XR thoracic spine 2 views    XR lumbar spine 2-3 views    Referral to Physical Therapy      Unspecified atrial fibrillation (Multi)        Relevant Medications    apixaban (Eliquis) 5 mg tablet      Heart failure, unspecified        Relevant Medications    levalbuterol (Xopenex) 45 mcg/actuation inhaler    apixaban (Eliquis) 5 mg tablet    furosemide (Lasix) 40 mg tablet    lisinopril 10 mg tablet      Primary hypertension        Relevant Medications    furosemide (Lasix) 40 mg tablet    lisinopril 10 mg tablet      Bilateral hip pain        Mild osteoarthritis right hip with tendinitis    Relevant Orders    XR hips bilateral 2 VW w pelvis when performed    Referral to Physical Therapy      Paresthesias        Relevant Orders    EMG & nerve conduction      Wellness examination          Chronic right shoulder pain        Suspect secondary to chronic severe osteoarthritis versus rotator cuff tear versus other                During the course of the visit the patient was educated and  counseled about age appropriate screening and preventive services. Completed preventive screenings were documented in the chart and orders were placed for outstanding screenings/procedures as documented in the Assessment and Plan.      Patient Instructions (the written plan) was given to the patient at check out.      Jan Martin, DO

## 2025-08-16 ENCOUNTER — OFFICE VISIT (OUTPATIENT)
Dept: URGENT CARE | Age: 75
End: 2025-08-16
Payer: MEDICARE

## 2025-08-16 VITALS
WEIGHT: 272 LBS | SYSTOLIC BLOOD PRESSURE: 102 MMHG | RESPIRATION RATE: 17 BRPM | BODY MASS INDEX: 48.2 KG/M2 | HEART RATE: 101 BPM | TEMPERATURE: 100 F | OXYGEN SATURATION: 96 % | DIASTOLIC BLOOD PRESSURE: 64 MMHG | HEIGHT: 63 IN

## 2025-08-16 DIAGNOSIS — Z20.822 CLOSE EXPOSURE TO COVID-19 VIRUS: ICD-10-CM

## 2025-08-16 DIAGNOSIS — U07.1 COVID: Primary | ICD-10-CM

## 2025-08-16 LAB
POC CORONAVIRUS SARS-COV-2 PCR: POSITIVE
POC HUMAN RHINOVIRUS PCR: NEGATIVE
POC INFLUENZA A VIRUS PCR: NEGATIVE
POC INFLUENZA B VIRUS PCR: NEGATIVE
POC RESPIRATORY SYNCYTIAL VIRUS PCR: NEGATIVE

## 2025-08-16 ASSESSMENT — ENCOUNTER SYMPTOMS
EYES NEGATIVE: 1
GASTROINTESTINAL NEGATIVE: 1
COUGH: 1
CONSTITUTIONAL NEGATIVE: 1
CARDIOVASCULAR NEGATIVE: 1

## 2025-08-16 ASSESSMENT — PATIENT HEALTH QUESTIONNAIRE - PHQ9
1. LITTLE INTEREST OR PLEASURE IN DOING THINGS: NOT AT ALL
SUM OF ALL RESPONSES TO PHQ9 QUESTIONS 1 AND 2: 0
2. FEELING DOWN, DEPRESSED OR HOPELESS: NOT AT ALL

## 2025-08-20 ENCOUNTER — EVALUATION (OUTPATIENT)
Dept: PHYSICAL THERAPY | Facility: CLINIC | Age: 75
End: 2025-08-20
Payer: MEDICARE

## 2025-08-20 DIAGNOSIS — G89.29 CHRONIC BILATERAL LOW BACK PAIN WITH BILATERAL SCIATICA: ICD-10-CM

## 2025-08-20 DIAGNOSIS — M25.552 BILATERAL HIP PAIN: Primary | ICD-10-CM

## 2025-08-20 DIAGNOSIS — M54.41 CHRONIC BILATERAL LOW BACK PAIN WITH BILATERAL SCIATICA: ICD-10-CM

## 2025-08-20 DIAGNOSIS — M25.551 BILATERAL HIP PAIN: Primary | ICD-10-CM

## 2025-08-20 DIAGNOSIS — M54.42 CHRONIC BILATERAL LOW BACK PAIN WITH BILATERAL SCIATICA: ICD-10-CM

## 2025-08-20 PROCEDURE — 97161 PT EVAL LOW COMPLEX 20 MIN: CPT | Mod: GP | Performed by: INTERNAL MEDICINE

## 2025-08-20 PROCEDURE — 97110 THERAPEUTIC EXERCISES: CPT | Mod: GP | Performed by: INTERNAL MEDICINE

## 2025-08-24 DIAGNOSIS — R03.0 ELEVATED BLOOD-PRESSURE READING, WITHOUT DIAGNOSIS OF HYPERTENSION: ICD-10-CM

## 2025-08-25 ENCOUNTER — HOSPITAL ENCOUNTER (OUTPATIENT)
Dept: RADIOLOGY | Facility: HOSPITAL | Age: 75
Discharge: HOME | End: 2025-08-25
Payer: MEDICARE

## 2025-08-25 DIAGNOSIS — M54.9 BACK PAIN, UNSPECIFIED BACK LOCATION, UNSPECIFIED BACK PAIN LATERALITY, UNSPECIFIED CHRONICITY: ICD-10-CM

## 2025-08-25 DIAGNOSIS — M25.551 BILATERAL HIP PAIN: ICD-10-CM

## 2025-08-25 DIAGNOSIS — M25.552 BILATERAL HIP PAIN: ICD-10-CM

## 2025-08-25 PROCEDURE — 72100 X-RAY EXAM L-S SPINE 2/3 VWS: CPT

## 2025-08-25 PROCEDURE — 72100 X-RAY EXAM L-S SPINE 2/3 VWS: CPT | Performed by: RADIOLOGY

## 2025-08-25 PROCEDURE — 73523 X-RAY EXAM HIPS BI 5/> VIEWS: CPT | Mod: BILATERAL PROCEDURE | Performed by: RADIOLOGY

## 2025-08-25 PROCEDURE — 73521 X-RAY EXAM HIPS BI 2 VIEWS: CPT

## 2025-08-25 PROCEDURE — 72070 X-RAY EXAM THORAC SPINE 2VWS: CPT

## 2025-08-25 RX ORDER — METOPROLOL SUCCINATE 50 MG/1
50 TABLET, EXTENDED RELEASE ORAL 2 TIMES DAILY
Qty: 180 TABLET | Refills: 0 | Status: SHIPPED | OUTPATIENT
Start: 2025-08-25

## 2025-08-28 ENCOUNTER — APPOINTMENT (OUTPATIENT)
Dept: OPHTHALMOLOGY | Facility: CLINIC | Age: 75
End: 2025-08-28
Payer: MEDICARE

## 2025-08-28 DIAGNOSIS — H25.813 COMBINED FORMS OF AGE-RELATED CATARACT OF BOTH EYES: Primary | ICD-10-CM

## 2025-08-28 PROCEDURE — 99213 OFFICE O/P EST LOW 20 MIN: CPT | Performed by: OPHTHALMOLOGY

## 2025-08-28 ASSESSMENT — REFRACTION_WEARINGRX
OD_ADD: +2.25
OS_SPHERE: -3.50
OS_CYLINDER: -1.25
OS_ADD: +2.25
OS_AXIS: 088
SPECS_TYPE: PAL
OD_AXIS: 044
OD_CYLINDER: -1.25
OD_SPHERE: -4.50

## 2025-08-28 ASSESSMENT — CONF VISUAL FIELD
OD_NORMAL: 1
OD_INFERIOR_NASAL_RESTRICTION: 0
OS_SUPERIOR_TEMPORAL_RESTRICTION: 0
OS_INFERIOR_NASAL_RESTRICTION: 0
OD_SUPERIOR_TEMPORAL_RESTRICTION: 0
OD_SUPERIOR_NASAL_RESTRICTION: 0
OS_NORMAL: 1
OS_INFERIOR_TEMPORAL_RESTRICTION: 0
OD_INFERIOR_TEMPORAL_RESTRICTION: 0
OS_SUPERIOR_NASAL_RESTRICTION: 0

## 2025-08-28 ASSESSMENT — EXTERNAL EXAM - RIGHT EYE: OD_EXAM: NORMAL

## 2025-08-28 ASSESSMENT — VISUAL ACUITY
OD_BAT_HIGH: 20/40
OD_CC: 20/30+1
CORRECTION_TYPE: GLASSES
OS_BAT_HIGH: 20/40-1
OS_CC: 20/30-1
METHOD: SNELLEN - LINEAR

## 2025-08-28 ASSESSMENT — ENCOUNTER SYMPTOMS: EYES NEGATIVE: 1

## 2025-08-28 ASSESSMENT — REFRACTION_MANIFEST
OS_ADD: +2.75
OS_SPHERE: -3.25
OS_AXIS: 110
OS_CYLINDER: -1.75
OD_AXIS: 035
OD_SPHERE: -4.00
OD_CYLINDER: -0.50
OD_ADD: +2.75

## 2025-08-28 ASSESSMENT — PACHYMETRY
OD_CT(UM): 650
EXAM_DATE: 3/28/2025
OS_CT(UM): 650

## 2025-08-28 ASSESSMENT — SLIT LAMP EXAM - LIDS
COMMENTS: NORMAL
COMMENTS: NORMAL

## 2025-08-28 ASSESSMENT — CUP TO DISC RATIO
OD_RATIO: 0.3
OS_RATIO: 0.3

## 2025-08-28 ASSESSMENT — EXTERNAL EXAM - LEFT EYE: OS_EXAM: NORMAL

## 2025-08-28 ASSESSMENT — TONOMETRY
IOP_METHOD: TONOPEN
OS_IOP_MMHG: 15
OD_IOP_MMHG: 15

## 2025-09-01 DIAGNOSIS — Z00.00 ENCOUNTER FOR GENERAL ADULT MEDICAL EXAMINATION WITHOUT ABNORMAL FINDINGS: ICD-10-CM

## 2025-09-04 RX ORDER — NORTRIPTYLINE HYDROCHLORIDE 10 MG/1
10 CAPSULE ORAL NIGHTLY
Qty: 90 CAPSULE | Refills: 0 | Status: SHIPPED | OUTPATIENT
Start: 2025-09-04

## 2025-10-28 ENCOUNTER — APPOINTMENT (OUTPATIENT)
Dept: PRIMARY CARE | Facility: CLINIC | Age: 75
End: 2025-10-28
Payer: MEDICARE

## 2026-08-27 ENCOUNTER — APPOINTMENT (OUTPATIENT)
Dept: OPHTHALMOLOGY | Facility: CLINIC | Age: 76
End: 2026-08-27
Payer: MEDICARE